# Patient Record
Sex: FEMALE | Race: ASIAN | NOT HISPANIC OR LATINO | URBAN - METROPOLITAN AREA
[De-identification: names, ages, dates, MRNs, and addresses within clinical notes are randomized per-mention and may not be internally consistent; named-entity substitution may affect disease eponyms.]

---

## 2018-05-02 ENCOUNTER — OUTPATIENT (OUTPATIENT)
Dept: OUTPATIENT SERVICES | Facility: HOSPITAL | Age: 33
LOS: 1 days | End: 2018-05-02
Payer: COMMERCIAL

## 2018-05-02 DIAGNOSIS — O26.899 OTHER SPECIFIED PREGNANCY RELATED CONDITIONS, UNSPECIFIED TRIMESTER: ICD-10-CM

## 2018-05-02 DIAGNOSIS — Z3A.00 WEEKS OF GESTATION OF PREGNANCY NOT SPECIFIED: ICD-10-CM

## 2018-05-02 PROCEDURE — 99214 OFFICE O/P EST MOD 30 MIN: CPT

## 2018-05-03 ENCOUNTER — OUTPATIENT (OUTPATIENT)
Dept: INPATIENT UNIT | Facility: HOSPITAL | Age: 33
LOS: 1 days | End: 2018-05-03

## 2018-05-03 ENCOUNTER — INPATIENT (INPATIENT)
Facility: HOSPITAL | Age: 33
LOS: 2 days | Discharge: HOME | End: 2018-05-06
Attending: OBSTETRICS & GYNECOLOGY | Admitting: OBSTETRICS & GYNECOLOGY

## 2018-05-03 ENCOUNTER — TRANSCRIPTION ENCOUNTER (OUTPATIENT)
Age: 33
End: 2018-05-03

## 2018-05-03 VITALS — HEIGHT: 62 IN | WEIGHT: 143.3 LBS

## 2018-05-03 DIAGNOSIS — Z3A.00 WEEKS OF GESTATION OF PREGNANCY NOT SPECIFIED: ICD-10-CM

## 2018-05-03 DIAGNOSIS — O26.90 PREGNANCY RELATED CONDITIONS, UNSPECIFIED, UNSPECIFIED TRIMESTER: ICD-10-CM

## 2018-05-03 DIAGNOSIS — O26.899 OTHER SPECIFIED PREGNANCY RELATED CONDITIONS, UNSPECIFIED TRIMESTER: ICD-10-CM

## 2018-05-03 LAB
BASOPHILS # BLD AUTO: 0.02 K/UL — SIGNIFICANT CHANGE UP (ref 0–0.2)
BASOPHILS # BLD AUTO: 0.04 K/UL — SIGNIFICANT CHANGE UP (ref 0–0.2)
BASOPHILS NFR BLD AUTO: 0.2 % — SIGNIFICANT CHANGE UP (ref 0–2)
BASOPHILS NFR BLD AUTO: 0.3 % — SIGNIFICANT CHANGE UP (ref 0–2)
BLD GP AB SCN SERPL QL: NEGATIVE — SIGNIFICANT CHANGE UP
EOSINOPHIL # BLD AUTO: 0.01 K/UL — SIGNIFICANT CHANGE UP (ref 0–0.5)
EOSINOPHIL # BLD AUTO: 0.04 K/UL — SIGNIFICANT CHANGE UP (ref 0–0.5)
EOSINOPHIL NFR BLD AUTO: 0.1 % — SIGNIFICANT CHANGE UP (ref 0–6)
EOSINOPHIL NFR BLD AUTO: 0.3 % — SIGNIFICANT CHANGE UP (ref 0–6)
HCT VFR BLD CALC: 40 % — SIGNIFICANT CHANGE UP (ref 34.5–45)
HCT VFR BLD CALC: 41.7 % — SIGNIFICANT CHANGE UP (ref 34.5–45)
HGB BLD-MCNC: 13.2 G/DL — SIGNIFICANT CHANGE UP (ref 11.5–15.5)
HGB BLD-MCNC: 13.8 G/DL — SIGNIFICANT CHANGE UP (ref 11.5–15.5)
IMM GRANULOCYTES # BLD AUTO: 0.05 # — SIGNIFICANT CHANGE UP
IMM GRANULOCYTES # BLD AUTO: 0.07 # — SIGNIFICANT CHANGE UP
IMM GRANULOCYTES NFR BLD AUTO: 0.4 % — SIGNIFICANT CHANGE UP (ref 0–1.5)
IMM GRANULOCYTES NFR BLD AUTO: 0.5 % — SIGNIFICANT CHANGE UP (ref 0–1.5)
LYMPHOCYTES # BLD AUTO: 1.81 K/UL — SIGNIFICANT CHANGE UP (ref 1–3.3)
LYMPHOCYTES # BLD AUTO: 13.7 % — SIGNIFICANT CHANGE UP (ref 13–44)
LYMPHOCYTES # BLD AUTO: 2.77 K/UL — SIGNIFICANT CHANGE UP (ref 1–3.3)
LYMPHOCYTES # BLD AUTO: 20.2 % — SIGNIFICANT CHANGE UP (ref 13–44)
MCHC RBC-ENTMCNC: 29 PG — SIGNIFICANT CHANGE UP (ref 27–34)
MCHC RBC-ENTMCNC: 29.3 PG — SIGNIFICANT CHANGE UP (ref 27–34)
MCHC RBC-ENTMCNC: 33 % — SIGNIFICANT CHANGE UP (ref 32–36)
MCHC RBC-ENTMCNC: 33.1 % — SIGNIFICANT CHANGE UP (ref 32–36)
MCV RBC AUTO: 87.6 FL — SIGNIFICANT CHANGE UP (ref 80–100)
MCV RBC AUTO: 88.7 FL — SIGNIFICANT CHANGE UP (ref 80–100)
MONOCYTES # BLD AUTO: 0.9 K/UL — SIGNIFICANT CHANGE UP (ref 0–0.9)
MONOCYTES # BLD AUTO: 0.98 K/UL — HIGH (ref 0–0.9)
MONOCYTES NFR BLD AUTO: 6.6 % — SIGNIFICANT CHANGE UP (ref 2–14)
MONOCYTES NFR BLD AUTO: 7.4 % — SIGNIFICANT CHANGE UP (ref 2–14)
NEUTROPHILS # BLD AUTO: 10.37 K/UL — HIGH (ref 1.8–7.4)
NEUTROPHILS # BLD AUTO: 9.87 K/UL — HIGH (ref 1.8–7.4)
NEUTROPHILS NFR BLD AUTO: 72.1 % — SIGNIFICANT CHANGE UP (ref 43–77)
NEUTROPHILS NFR BLD AUTO: 78.2 % — HIGH (ref 43–77)
NRBC # FLD: 0 — SIGNIFICANT CHANGE UP
NRBC # FLD: 0 — SIGNIFICANT CHANGE UP
PLATELET # BLD AUTO: 212 K/UL — SIGNIFICANT CHANGE UP (ref 150–400)
PLATELET # BLD AUTO: 243 K/UL — SIGNIFICANT CHANGE UP (ref 150–400)
PMV BLD: 10.9 FL — SIGNIFICANT CHANGE UP (ref 7–13)
PMV BLD: 11.3 FL — SIGNIFICANT CHANGE UP (ref 7–13)
RBC # BLD: 4.51 M/UL — SIGNIFICANT CHANGE UP (ref 3.8–5.2)
RBC # BLD: 4.76 M/UL — SIGNIFICANT CHANGE UP (ref 3.8–5.2)
RBC # FLD: 13 % — SIGNIFICANT CHANGE UP (ref 10.3–14.5)
RBC # FLD: 13.2 % — SIGNIFICANT CHANGE UP (ref 10.3–14.5)
RH IG SCN BLD-IMP: POSITIVE — SIGNIFICANT CHANGE UP
RH IG SCN BLD-IMP: POSITIVE — SIGNIFICANT CHANGE UP
T PALLIDUM AB TITR SER: NEGATIVE — SIGNIFICANT CHANGE UP
WBC # BLD: 13.24 K/UL — HIGH (ref 3.8–10.5)
WBC # BLD: 13.69 K/UL — HIGH (ref 3.8–10.5)
WBC # FLD AUTO: 13.24 K/UL — HIGH (ref 3.8–10.5)
WBC # FLD AUTO: 13.69 K/UL — HIGH (ref 3.8–10.5)

## 2018-05-03 RX ORDER — SODIUM CHLORIDE 9 MG/ML
1000 INJECTION, SOLUTION INTRAVENOUS
Qty: 0 | Refills: 0 | Status: DISCONTINUED | OUTPATIENT
Start: 2018-05-03 | End: 2018-05-03

## 2018-05-03 RX ORDER — CITRIC ACID/SODIUM CITRATE 300-500 MG
15 SOLUTION, ORAL ORAL EVERY 4 HOURS
Qty: 0 | Refills: 0 | Status: DISCONTINUED | OUTPATIENT
Start: 2018-05-03 | End: 2018-05-04

## 2018-05-03 RX ORDER — CITRIC ACID/SODIUM CITRATE 300-500 MG
15 SOLUTION, ORAL ORAL EVERY 4 HOURS
Qty: 0 | Refills: 0 | Status: DISCONTINUED | OUTPATIENT
Start: 2018-05-03 | End: 2018-05-03

## 2018-05-03 RX ORDER — SODIUM CHLORIDE 9 MG/ML
1000 INJECTION, SOLUTION INTRAVENOUS ONCE
Qty: 0 | Refills: 0 | Status: COMPLETED | OUTPATIENT
Start: 2018-05-03 | End: 2018-05-03

## 2018-05-03 RX ORDER — OXYTOCIN 10 UNIT/ML
333.33 VIAL (ML) INJECTION
Qty: 20 | Refills: 0 | Status: DISCONTINUED | OUTPATIENT
Start: 2018-05-03 | End: 2018-05-03

## 2018-05-03 RX ORDER — OXYTOCIN 10 UNIT/ML
333.3 VIAL (ML) INJECTION
Qty: 20 | Refills: 0 | Status: DISCONTINUED | OUTPATIENT
Start: 2018-05-03 | End: 2018-05-04

## 2018-05-03 RX ORDER — SODIUM CHLORIDE 9 MG/ML
1000 INJECTION, SOLUTION INTRAVENOUS ONCE
Qty: 0 | Refills: 0 | Status: DISCONTINUED | OUTPATIENT
Start: 2018-05-03 | End: 2018-05-03

## 2018-05-03 RX ORDER — SODIUM CHLORIDE 9 MG/ML
1000 INJECTION, SOLUTION INTRAVENOUS
Qty: 0 | Refills: 0 | Status: DISCONTINUED | OUTPATIENT
Start: 2018-05-03 | End: 2018-05-04

## 2018-05-03 RX ADMIN — SODIUM CHLORIDE 2000 MILLILITER(S): 9 INJECTION, SOLUTION INTRAVENOUS at 20:57

## 2018-05-03 RX ADMIN — SODIUM CHLORIDE 250 MILLILITER(S): 9 INJECTION, SOLUTION INTRAVENOUS at 04:01

## 2018-05-03 RX ADMIN — SODIUM CHLORIDE 2000 MILLILITER(S): 9 INJECTION, SOLUTION INTRAVENOUS at 03:30

## 2018-05-03 NOTE — DISCHARGE NOTE OB - PLAN OF CARE
Return to baseline level of activity Return to labor and delivery if contractions become more frequent, if there is vaginal bleeding, vaginal fluid leaking, or decreased fetal movement. Follow- up with your doctor in 1-2 days.

## 2018-05-03 NOTE — DISCHARGE NOTE OB - PATIENT PORTAL LINK FT
You can access the [facility] Patient Portal, offered by [facility], by registering with the following website: [email or web address]

## 2018-05-03 NOTE — PATIENT PROFILE OB - TRANSPORTATION AVAILABLE, PROFILE
VSS. Up independently. No c/o abdominal pain or nausea. Tolerated regular diet. Reviewed AVS with patient. Answered all the questions. No concerns at this time. Discharge instruction sheets given. Alert and orient x 4. Left floor via wheelchair.     car

## 2018-05-03 NOTE — DISCHARGE NOTE OB - CARE PLAN
Principal Discharge DX:	38 weeks gestation of pregnancy  Goal:	Return to baseline level of activity  Assessment and plan of treatment:	Return to labor and delivery if contractions become more frequent, if there is vaginal bleeding, vaginal fluid leaking, or decreased fetal movement. Follow- up with your doctor in 1-2 days.

## 2018-05-03 NOTE — DISCHARGE NOTE OB - CARE PROVIDER_API CALL
Sarkis Hoover (YASIR), Obstetrics and Gynecology  [address] [place] [address]  Phone: [phone]  Fax: [phone]

## 2018-05-03 NOTE — DISCHARGE NOTE OB - HOSPITAL COURSE
Patient admitted in early labor. Epidural placed. No cervical change made during admission. Epidural removed and patient comfortable. Given labor precautions.

## 2018-05-04 ENCOUNTER — TRANSCRIPTION ENCOUNTER (OUTPATIENT)
Age: 33
End: 2018-05-04

## 2018-05-04 LAB — T PALLIDUM AB TITR SER: NEGATIVE — SIGNIFICANT CHANGE UP

## 2018-05-04 RX ORDER — SODIUM CHLORIDE 9 MG/ML
3 INJECTION INTRAMUSCULAR; INTRAVENOUS; SUBCUTANEOUS EVERY 8 HOURS
Qty: 0 | Refills: 0 | Status: DISCONTINUED | OUTPATIENT
Start: 2018-05-04 | End: 2018-05-04

## 2018-05-04 RX ORDER — PRAMOXINE HYDROCHLORIDE 150 MG/15G
1 AEROSOL, FOAM RECTAL EVERY 4 HOURS
Qty: 0 | Refills: 0 | Status: DISCONTINUED | OUTPATIENT
Start: 2018-05-04 | End: 2018-05-05

## 2018-05-04 RX ORDER — ACETAMINOPHEN 500 MG
975 TABLET ORAL EVERY 6 HOURS
Qty: 0 | Refills: 0 | Status: COMPLETED | OUTPATIENT
Start: 2018-05-04 | End: 2019-04-02

## 2018-05-04 RX ORDER — ACETAMINOPHEN 500 MG
975 TABLET ORAL EVERY 6 HOURS
Qty: 0 | Refills: 0 | Status: DISCONTINUED | OUTPATIENT
Start: 2018-05-04 | End: 2018-05-06

## 2018-05-04 RX ORDER — AER TRAVELER 0.5 G/1
1 SOLUTION RECTAL; TOPICAL EVERY 4 HOURS
Qty: 0 | Refills: 0 | Status: DISCONTINUED | OUTPATIENT
Start: 2018-05-04 | End: 2018-05-06

## 2018-05-04 RX ORDER — OXYTOCIN 10 UNIT/ML
41.67 VIAL (ML) INJECTION
Qty: 20 | Refills: 0 | Status: DISCONTINUED | OUTPATIENT
Start: 2018-05-04 | End: 2018-05-04

## 2018-05-04 RX ORDER — DIPHENHYDRAMINE HCL 50 MG
25 CAPSULE ORAL EVERY 6 HOURS
Qty: 0 | Refills: 0 | Status: DISCONTINUED | OUTPATIENT
Start: 2018-05-04 | End: 2018-05-06

## 2018-05-04 RX ORDER — MAGNESIUM HYDROXIDE 400 MG/1
30 TABLET, CHEWABLE ORAL
Qty: 0 | Refills: 0 | Status: DISCONTINUED | OUTPATIENT
Start: 2018-05-04 | End: 2018-05-06

## 2018-05-04 RX ORDER — HYDROCORTISONE 1 %
1 OINTMENT (GRAM) TOPICAL EVERY 4 HOURS
Qty: 0 | Refills: 0 | Status: DISCONTINUED | OUTPATIENT
Start: 2018-05-04 | End: 2018-05-05

## 2018-05-04 RX ORDER — TETANUS TOXOID, REDUCED DIPHTHERIA TOXOID AND ACELLULAR PERTUSSIS VACCINE, ADSORBED 5; 2.5; 8; 8; 2.5 [IU]/.5ML; [IU]/.5ML; UG/.5ML; UG/.5ML; UG/.5ML
0.5 SUSPENSION INTRAMUSCULAR ONCE
Qty: 0 | Refills: 0 | Status: DISCONTINUED | OUTPATIENT
Start: 2018-05-04 | End: 2018-05-06

## 2018-05-04 RX ORDER — KETOROLAC TROMETHAMINE 30 MG/ML
30 SYRINGE (ML) INJECTION ONCE
Qty: 0 | Refills: 0 | Status: DISCONTINUED | OUTPATIENT
Start: 2018-05-04 | End: 2018-05-04

## 2018-05-04 RX ORDER — HYDROCORTISONE 1 %
1 OINTMENT (GRAM) TOPICAL EVERY 4 HOURS
Qty: 0 | Refills: 0 | Status: DISCONTINUED | OUTPATIENT
Start: 2018-05-04 | End: 2018-05-04

## 2018-05-04 RX ORDER — IBUPROFEN 200 MG
600 TABLET ORAL EVERY 6 HOURS
Qty: 0 | Refills: 0 | Status: DISCONTINUED | OUTPATIENT
Start: 2018-05-04 | End: 2018-05-06

## 2018-05-04 RX ORDER — PRAMOXINE HYDROCHLORIDE 150 MG/15G
1 AEROSOL, FOAM RECTAL EVERY 4 HOURS
Qty: 0 | Refills: 0 | Status: DISCONTINUED | OUTPATIENT
Start: 2018-05-04 | End: 2018-05-04

## 2018-05-04 RX ORDER — LANOLIN
1 OINTMENT (GRAM) TOPICAL EVERY 6 HOURS
Qty: 0 | Refills: 0 | Status: DISCONTINUED | OUTPATIENT
Start: 2018-05-04 | End: 2018-05-06

## 2018-05-04 RX ORDER — SIMETHICONE 80 MG/1
80 TABLET, CHEWABLE ORAL EVERY 6 HOURS
Qty: 0 | Refills: 0 | Status: DISCONTINUED | OUTPATIENT
Start: 2018-05-04 | End: 2018-05-06

## 2018-05-04 RX ORDER — GLYCERIN ADULT
1 SUPPOSITORY, RECTAL RECTAL AT BEDTIME
Qty: 0 | Refills: 0 | Status: DISCONTINUED | OUTPATIENT
Start: 2018-05-04 | End: 2018-05-06

## 2018-05-04 RX ORDER — OXYTOCIN 10 UNIT/ML
41.67 VIAL (ML) INJECTION
Qty: 20 | Refills: 0 | Status: DISCONTINUED | OUTPATIENT
Start: 2018-05-04 | End: 2018-05-05

## 2018-05-04 RX ORDER — IBUPROFEN 200 MG
600 TABLET ORAL EVERY 6 HOURS
Qty: 0 | Refills: 0 | Status: COMPLETED | OUTPATIENT
Start: 2018-05-04 | End: 2019-04-02

## 2018-05-04 RX ORDER — OXYCODONE HYDROCHLORIDE 5 MG/1
5 TABLET ORAL
Qty: 0 | Refills: 0 | Status: DISCONTINUED | OUTPATIENT
Start: 2018-05-04 | End: 2018-05-06

## 2018-05-04 RX ORDER — DIBUCAINE 1 %
1 OINTMENT (GRAM) RECTAL EVERY 4 HOURS
Qty: 0 | Refills: 0 | Status: DISCONTINUED | OUTPATIENT
Start: 2018-05-04 | End: 2018-05-04

## 2018-05-04 RX ORDER — SODIUM CHLORIDE 9 MG/ML
3 INJECTION INTRAMUSCULAR; INTRAVENOUS; SUBCUTANEOUS EVERY 8 HOURS
Qty: 0 | Refills: 0 | Status: DISCONTINUED | OUTPATIENT
Start: 2018-05-04 | End: 2018-05-06

## 2018-05-04 RX ORDER — OXYCODONE HYDROCHLORIDE 5 MG/1
5 TABLET ORAL EVERY 4 HOURS
Qty: 0 | Refills: 0 | Status: DISCONTINUED | OUTPATIENT
Start: 2018-05-04 | End: 2018-05-06

## 2018-05-04 RX ORDER — AER TRAVELER 0.5 G/1
1 SOLUTION RECTAL; TOPICAL EVERY 4 HOURS
Qty: 0 | Refills: 0 | Status: DISCONTINUED | OUTPATIENT
Start: 2018-05-04 | End: 2018-05-04

## 2018-05-04 RX ORDER — DIBUCAINE 1 %
1 OINTMENT (GRAM) RECTAL EVERY 4 HOURS
Qty: 0 | Refills: 0 | Status: DISCONTINUED | OUTPATIENT
Start: 2018-05-04 | End: 2018-05-06

## 2018-05-04 RX ORDER — DOCUSATE SODIUM 100 MG
100 CAPSULE ORAL
Qty: 0 | Refills: 0 | Status: DISCONTINUED | OUTPATIENT
Start: 2018-05-04 | End: 2018-05-06

## 2018-05-04 RX ADMIN — SODIUM CHLORIDE 3 MILLILITER(S): 9 INJECTION INTRAMUSCULAR; INTRAVENOUS; SUBCUTANEOUS at 21:07

## 2018-05-04 RX ADMIN — Medication 30 MILLIGRAM(S): at 08:37

## 2018-05-04 RX ADMIN — Medication 30 MILLIGRAM(S): at 07:45

## 2018-05-04 RX ADMIN — SODIUM CHLORIDE 250 MILLILITER(S): 9 INJECTION, SOLUTION INTRAVENOUS at 02:15

## 2018-05-04 RX ADMIN — Medication 975 MILLIGRAM(S): at 17:03

## 2018-05-04 RX ADMIN — Medication 600 MILLIGRAM(S): at 17:03

## 2018-05-04 RX ADMIN — Medication 975 MILLIGRAM(S): at 18:03

## 2018-05-04 RX ADMIN — Medication 600 MILLIGRAM(S): at 18:03

## 2018-05-04 RX ADMIN — Medication 125 MILLIUNIT(S)/MIN: at 07:44

## 2018-05-04 RX ADMIN — SODIUM CHLORIDE 3 MILLILITER(S): 9 INJECTION INTRAMUSCULAR; INTRAVENOUS; SUBCUTANEOUS at 16:42

## 2018-05-04 NOTE — DISCHARGE NOTE OB - CARE PROVIDER_API CALL
Sarkis Hoover (YASIR), Obstetrics and Gynecology  [address] [place] [address]  Phone: [phone]  Fax: [phone]

## 2018-05-04 NOTE — DISCHARGE NOTE OB - PATIENT PORTAL LINK FT
You can access the [facility] Patient Portal, offered by [facility], by registering with the following website: [email or web address]

## 2018-05-04 NOTE — DISCHARGE NOTE OB - CARE PLAN
Principal Discharge DX:	 (normal spontaneous vaginal delivery)  Goal:	Good recovery  Assessment and plan of treatment:	f/u  6 weeks

## 2018-05-04 NOTE — LACTATION INITIAL EVALUATION - INTERVENTION OUTCOME
verbalizes understanding/demonstrates understanding of teaching/encouraged   skin  to  skin  , observe  feeding  cues  ,  offere assistance  as  needed.

## 2018-05-04 NOTE — LACTATION INITIAL EVALUATION - LACTATION INTERVENTIONS
initiate skin to skin/initiate hand expression routine/assisted with deep latch and positioning  discussed  signs  of  effective  feeding and  swallowing. discussed  compression at  breast when  nbn  stops  drinking  and  is  still sucking..   if  nbn  not  breastfeeding  effectively  hand  express  and  pump  and   give  teaspoons  between  feedings alternative  feeding   method.

## 2018-05-04 NOTE — DISCHARGE NOTE OB - MATERIALS PROVIDED
Immunization Record/Breastfeeding Log/[facility] Hearing Screen Program/Shaken Baby Prevention Handout/Back To Sleep Handout

## 2018-05-05 RX ORDER — PRAMOXINE HYDROCHLORIDE 150 MG/15G
1 AEROSOL, FOAM RECTAL EVERY 4 HOURS
Qty: 0 | Refills: 0 | Status: DISCONTINUED | OUTPATIENT
Start: 2018-05-05 | End: 2018-05-06

## 2018-05-05 RX ORDER — HYDROCORTISONE 1 %
1 OINTMENT (GRAM) TOPICAL EVERY 4 HOURS
Qty: 0 | Refills: 0 | Status: DISCONTINUED | OUTPATIENT
Start: 2018-05-05 | End: 2018-05-06

## 2018-05-05 RX ADMIN — Medication 600 MILLIGRAM(S): at 00:02

## 2018-05-05 RX ADMIN — Medication 600 MILLIGRAM(S): at 10:15

## 2018-05-05 RX ADMIN — Medication 600 MILLIGRAM(S): at 23:53

## 2018-05-05 RX ADMIN — Medication 600 MILLIGRAM(S): at 01:57

## 2018-05-05 RX ADMIN — Medication 975 MILLIGRAM(S): at 00:02

## 2018-05-05 RX ADMIN — Medication 975 MILLIGRAM(S): at 18:15

## 2018-05-05 RX ADMIN — Medication 975 MILLIGRAM(S): at 01:57

## 2018-05-05 RX ADMIN — Medication 975 MILLIGRAM(S): at 09:15

## 2018-05-05 RX ADMIN — Medication 975 MILLIGRAM(S): at 10:15

## 2018-05-05 RX ADMIN — Medication 600 MILLIGRAM(S): at 19:15

## 2018-05-05 RX ADMIN — Medication 975 MILLIGRAM(S): at 19:15

## 2018-05-05 RX ADMIN — Medication 600 MILLIGRAM(S): at 09:15

## 2018-05-05 RX ADMIN — Medication 975 MILLIGRAM(S): at 23:53

## 2018-05-05 RX ADMIN — Medication 600 MILLIGRAM(S): at 18:15

## 2018-05-05 NOTE — PROGRESS NOTE ADULT - SUBJECTIVE AND OBJECTIVE BOX
Anesthesia Post-op Note    POD#1 S/P labor epidural    Patient is doing well.  OOBAA. Tolerating diet.  No residual anesthetic issues or complications noted.

## 2018-05-06 VITALS
DIASTOLIC BLOOD PRESSURE: 71 MMHG | OXYGEN SATURATION: 100 % | TEMPERATURE: 98 F | SYSTOLIC BLOOD PRESSURE: 115 MMHG | HEART RATE: 61 BPM | RESPIRATION RATE: 18 BRPM

## 2018-05-06 RX ORDER — ACETAMINOPHEN 500 MG
3 TABLET ORAL
Qty: 0 | Refills: 0 | DISCHARGE
Start: 2018-05-06

## 2018-05-06 RX ORDER — IBUPROFEN 200 MG
1 TABLET ORAL
Qty: 0 | Refills: 0 | DISCHARGE
Start: 2018-05-06

## 2018-05-06 RX ADMIN — Medication 600 MILLIGRAM(S): at 09:00

## 2018-05-06 RX ADMIN — Medication 600 MILLIGRAM(S): at 09:45

## 2018-05-06 RX ADMIN — Medication 975 MILLIGRAM(S): at 09:00

## 2018-05-06 RX ADMIN — Medication 600 MILLIGRAM(S): at 00:21

## 2018-05-06 RX ADMIN — Medication 975 MILLIGRAM(S): at 09:45

## 2018-05-06 RX ADMIN — Medication 975 MILLIGRAM(S): at 00:21

## 2019-01-18 NOTE — DISCHARGE NOTE OB - CHANGE SANITARY PADS FREQUENTLY.  WASHING AND WIPING SHOULD OCCUR FROM FRONT TO BACK
Patient Education     Skin Tear (Skin Avulsion)  A skin avulsion is a tearing of the top layer of skin. This commonly happens after a fall or other injury. It also tends to be more common in older people, or those taking blood thinners or steroids for long periods of time.  Home care  The following guidelines will help you care for your wound at home:  · Keep the wound clean and dry for the first 48 hours after the stitches have been placed.  · If there is a dressing or bandage, change it when it gets wet or dirty. Otherwise, leave it on for the first 24 hours, then change it once a day or as often as the doctor says.  · If stitches or staples were used, check the wound every day.  · After taking off the dressing, wash the area gently with soap and water. Clean as close to the stitches as you can. Avoid washing or rubbing the stitches directly.  · After 3 days you can keep the bandages off the wound, unless told otherwise. Allow the wound to be open to the air.  · Keep a thin layer of antibiotic ointment on the cut. This will keep the wound clean, make it easier to remove the stitches, and reduce scarring.  · If your wound is oozing, you can put a nonstick dressing over it. Then, reapply the bandage or dressing as you were told.  · You can shower as usual after the first 24 hours, but don't soak the area in water (no baths or swimming) until the stitches or staples are taken out.  · If surgical tape was used, keep the area clean and dry. If it becomes wet, blot it dry with a towel.  · If skin glue was used, don't put any creams, lotions, or antibiotic ointments on it. These can dissolve the glue. Usually the glue will flake off in about 5 to 10 days by itself. Try to resist picking it off before that so the wound doesn't open up. When it gets wet, dry it gently.  Here is some information about medications:  · You may use acetaminophen or ibuprofen to control pain, unless another pain medicine was given. If you  have chronic liver or kidney disease or ever had a stomach ulcer or GI bleeding, talk with your doctor before using these medicines.  · If you were given antibiotics, take them until they are all used up. It is important to finish the antibiotics even if the wound looks better. This will ensure that the infection has cleared.  Follow-up care  Follow up with your doctor, clinic, or this facility as you were advised, and:  · Watch for any signs of infection, such as increasing redness, swelling, or pus coming out. If this happens, don't wait for your scheduled visit. Instead, see a doctor sooner.  · Stitches or staples are usually taken out within 5 to 14 days. This varies depending on what part of your body they are on, and the type of wound. The doctor will tell you how long stitches should be left in.   · If surgical tape was used, it is usually left on for 7 to 10 days. You can remove surgical tape after that unless you were told otherwise. If you try to remove it, and it is too hard, soaking can help. If the edges of the cut pull apart, stop removing the tape and follow up with your doctor  · As mentioned above, skin glue will flake off by itself in 5 to 10 days, so you don't need to pull it off.  Note: If any X-rays were done, a radiologist will review them. You will be notified of any changes that may affect your care.  When to seek medical care  Get prompt medical attention if any of the following occur:  · Increasing pain in the wound  · Redness, swelling, or pus coming from the wound  · Fever of 100.4ºF (38ºC) or higher, or as directed by your health care provider  · Sutures or staples come apart or fall out before your next appointment  · Surgical tape closures fall off within 7 days, or the wound edges re-open  · Bleeding not controlled by direct pressure  © 9350-5881 [employer]. [address] [place] [address], [place] [address]. All rights reserved. This information is not intended as a substitute  for professional medical care. Always follow your healthcare professional's instructions.            Statement Selected

## 2022-03-07 ENCOUNTER — INPATIENT (INPATIENT)
Facility: HOSPITAL | Age: 37
LOS: 2 days | Discharge: HOME | End: 2022-03-10
Attending: OBSTETRICS & GYNECOLOGY | Admitting: OBSTETRICS & GYNECOLOGY

## 2022-03-07 ENCOUNTER — TRANSCRIPTION ENCOUNTER (OUTPATIENT)
Age: 37
End: 2022-03-07

## 2022-03-07 VITALS
SYSTOLIC BLOOD PRESSURE: 110 MMHG | DIASTOLIC BLOOD PRESSURE: 66 MMHG | RESPIRATION RATE: 16 BRPM | HEART RATE: 86 BPM | TEMPERATURE: 99 F

## 2022-03-07 DIAGNOSIS — O26.899 OTHER SPECIFIED PREGNANCY RELATED CONDITIONS, UNSPECIFIED TRIMESTER: ICD-10-CM

## 2022-03-07 DIAGNOSIS — Z3A.00 WEEKS OF GESTATION OF PREGNANCY NOT SPECIFIED: ICD-10-CM

## 2022-03-07 RX ORDER — SODIUM CHLORIDE 9 MG/ML
1000 INJECTION, SOLUTION INTRAVENOUS ONCE
Refills: 0 | Status: COMPLETED | OUTPATIENT
Start: 2022-03-07 | End: 2022-03-08

## 2022-03-07 RX ORDER — OXYTOCIN 10 UNIT/ML
333.33 VIAL (ML) INJECTION
Qty: 20 | Refills: 0 | Status: DISCONTINUED | OUTPATIENT
Start: 2022-03-07 | End: 2022-03-10

## 2022-03-07 RX ORDER — SODIUM CHLORIDE 9 MG/ML
1000 INJECTION, SOLUTION INTRAVENOUS
Refills: 0 | Status: DISCONTINUED | OUTPATIENT
Start: 2022-03-07 | End: 2022-03-08

## 2022-03-07 RX ORDER — AMPICILLIN TRIHYDRATE 250 MG
1 CAPSULE ORAL EVERY 4 HOURS
Refills: 0 | Status: DISCONTINUED | OUTPATIENT
Start: 2022-03-07 | End: 2022-03-08

## 2022-03-07 RX ORDER — AMPICILLIN TRIHYDRATE 250 MG
2 CAPSULE ORAL ONCE
Refills: 0 | Status: COMPLETED | OUTPATIENT
Start: 2022-03-07 | End: 2022-03-07

## 2022-03-07 RX ORDER — SODIUM CHLORIDE 9 MG/ML
500 INJECTION, SOLUTION INTRAVENOUS ONCE
Refills: 0 | Status: DISCONTINUED | OUTPATIENT
Start: 2022-03-07 | End: 2022-03-10

## 2022-03-07 NOTE — OB PROVIDER H&P - ASSESSMENT
37 yo AMA  @ 39.3 wks c/o contractions 8/10 pain worse q 3 minutes since 8pm. denies vb or lof, reports +GFM. AP course complicated by +GBS. denies fever chills ha n/v  new swelling vision changes epigastric pain cp sob or cough. last saw OB today 2-3 cm.    GBS: positive  meds: PNV  All denies  PMH: denies  PSH: denies  gyn hx: denies  OB hx:  2018 6#3, MAB 2021    d/w Dr Sommer admit for labor @ 39.3 wks  epidural for pain control  Ampicillin for +GBS  prentals reviewed  covid deferred 2/2 testing positive 21  repeat VE exam  IV fluid bolus for FHT     35 yo AMA  @ 39.3 wks c/o contractions 8/10 pain worse q 3 minutes since 8pm. denies vb or lof, reports +GFM. AP course complicated by +GBS. denies fever chills ha n/v  new swelling vision changes epigastric pain cp sob or cough. last saw OB today 2-3 cm.    GBS: positive  meds: PNV  All denies  PMH: denies  PSH: denies  gyn hx: denies  OB hx:  2018 6#3, MAB 2021    d/w Dr Sommer admit for labor @ 39.3 wks  epidural for pain control  Ampicillin for +GBS  prentals reviewed  covid deferred 2/2 testing positive 21  repeat VE exam  IV fluid bolus for FHT    FHT discussed with Dr Almanza- IV bolus ordered

## 2022-03-07 NOTE — OB PROVIDER H&P - NSHPPHYSICALEXAM_GEN_ALL_CORE
abd soft gravid NT  CV RRR  LS clear bilaterally  TAS: vertex  SVE: 4.5/80/-3 with bulging membranes  FHT: minimal variability, + late decelerations  toco: q 4 minutes  Vital Signs Last 24 Hrs  T(C): 37.2 (07 Mar 2022 22:47), Max: 37.2 (07 Mar 2022 22:47)  T(F): 99 (07 Mar 2022 22:47), Max: 99 (07 Mar 2022 22:47)  HR: 86 (07 Mar 2022 23:18) (86 - 86)  BP: 124/69 (07 Mar 2022 23:18) (110/66 - 124/69)  BP(mean): --  RR: 16 (07 Mar 2022 22:47) (16 - 16)  SpO2: -- abd soft gravid NT  CV RRR  LS clear bilaterally  TAS: vertex  SVE: 4.5/80/-3 with bulging membranes  FHT: minimal variability, no accelerations, + late decelerations- pt repositioned on left side and IV fluid bolus  toco: q 4 minutes  Vital Signs Last 24 Hrs  T(C): 37.2 (07 Mar 2022 22:47), Max: 37.2 (07 Mar 2022 22:47)  T(F): 99 (07 Mar 2022 22:47), Max: 99 (07 Mar 2022 22:47)  HR: 86 (07 Mar 2022 23:18) (86 - 86)  BP: 124/69 (07 Mar 2022 23:18) (110/66 - 124/69)  BP(mean): --  RR: 16 (07 Mar 2022 22:47) (16 - 16)  SpO2: --

## 2022-03-07 NOTE — OB RN TRIAGE NOTE - FALL HARM RISK - UNIVERSAL INTERVENTIONS
Bed in lowest position, wheels locked, appropriate side rails in place/Call bell, personal items and telephone in reach/Instruct patient to call for assistance before getting out of bed or chair/Non-slip footwear when patient is out of bed/[place] to call system/Physically safe environment - no spills, clutter or unnecessary equipment/Purposeful Proactive Rounding/Room/bathroom lighting operational, light cord in reach

## 2022-03-07 NOTE — OB PROVIDER H&P - NS_FINALEDD_OBGYN_ALL_OB_DT
Call received from patient stating he cracked one of his molars on 12/28/21. Patient called 12/29 requesting antibiotics. Patient received a prescription on 12/30. States he saw [facility] on Saturday. They would not pull the tooth because his blood pressure was too high. States blood pressure was 185/101 and 176/110. Patient was advised to follow up with primary care provider. He was also referred to an oral surgeon. Patient was having a lot of pain when he was seen on Saturday so feels the high blood pressure was related to this. Patient feels the antibiotic must have started to help because the pain improved yesterday. States it now only hurts when he bites down. Appointment scheduled.    11-Mar-2022

## 2022-03-07 NOTE — OB PROVIDER H&P - PROBLEM SELECTOR PLAN 1
d/w Dr Sommer admit for labor @ 39.3 wks  epidural for pain control  Ampicillin for +GBS  prentals reviewed  covid deferred 2/2 testing positive 12/18/21  repeat VE exam  IV fluid bolus for FHT

## 2022-03-07 NOTE — OB PROVIDER H&P - HISTORY OF PRESENT ILLNESS
37 yo AMA  @ 39.3 wks c/o contractions 8/10 pain worse q 3 minutes since 8pm. denies vb or lof, reports +GFM. AP course complicated by +GBS. denies fever chills ha n/v  new swelling vision changes epigastric pain cp sob or cough. last saw OB today 2-3 cm.  GBS: positive  meds: PNV  All denies  PMH: denies  PSH: denies  gyn hx: denies  OB hx:  2018 6#3, MAB 2021

## 2022-03-07 NOTE — OB PROVIDER H&P - NSPPHNORISK_OBGYN_ALL_OB
----- Message from Serena Garcia MD sent at 10/11/2019  4:41 PM EDT -----  Approved  ----- Message -----  From: Francisca Lo MA  Sent: 10/10/2019  12:42 PM EDT  To: Sleep Medicine [place] Provider    This sleep study needs approval      If approved please sign and return to clerical pool  If denied please include reasons why  Also provide alternative testing if warranted  Please sign and return to clerical pool  In my judgment no risk for PPH has been identified at this time.

## 2022-03-07 NOTE — OB PROVIDER TRIAGE NOTE - HISTORY OF PRESENT ILLNESS
35 yo AMA  @ 39.3 wks c/o contractions 8/10 pain worse q 3 minutes since 8pm. denies vb or lof, reports +GFM. AP course complicated by +GBS. denies fever chills ha n/v  new swelling vision changes epigastric pain cp sob or cough. last saw OB today 2-3 cm.  GBS: positive  meds: PNV  All denies  PMH: denies  PSH: denies  gyn hx: denies  OB hx:  2018 6#3, MAB 2021

## 2022-03-07 NOTE — OB RN TRIAGE NOTE - NSICDXPASTMEDICALHX_GEN_ALL_CORE_FT
PAST MEDICAL HISTORY:  Spontaneous miscarriage     Vaginal delivery 2018     PAST MEDICAL HISTORY:  2019 novel coronavirus disease (COVID-19) 12/18/2021

## 2022-03-07 NOTE — OB PROVIDER TRIAGE NOTE - NSOBPROVIDERNOTE_OBGYN_ALL_OB_FT
35 yo AMA  @ 39.3 wks c/o contractions 8/10 pain worse q 3 minutes since 8pm. denies vb or lof, reports +GFM. AP course complicated by +GBS. denies fever chills ha n/v  new swelling vision changes epigastric pain cp sob or cough. last saw OB today 2-3 cm.  GBS: positive  meds: PNV  All denies  PMH: denies  PSH: denies  gyn hx: denies  OB hx:  2018 6#3, MAB 2021    d/w Dr Sommer admit for labor @ 39.3 wks  epidural for pain control  Ampicillin for +GBS  prentals reviewed  covid deferred 2/2 testing positive 21  repeat VE exam  IV fluid bolus for FHT

## 2022-03-07 NOTE — OB PROVIDER TRIAGE NOTE - NSHPPHYSICALEXAM_GEN_ALL_CORE
abd soft gravid NT  CV RRR  LS clear bilaterally  TAS: vertex  SVE: 4.5/80/-3 with bulging membranes  FHT: minimal variability, + late decelerations  toco: q 4 minutes  Vital Signs Last 24 Hrs  T(C): 37.2 (07 Mar 2022 22:47), Max: 37.2 (07 Mar 2022 22:47)  T(F): 99 (07 Mar 2022 22:47), Max: 99 (07 Mar 2022 22:47)  HR: 86 (07 Mar 2022 23:18) (86 - 86)  BP: 124/69 (07 Mar 2022 23:18) (110/66 - 124/69)  BP(mean): --  RR: 16 (07 Mar 2022 22:47) (16 - 16)  SpO2: --

## 2022-03-08 LAB
BASOPHILS # BLD AUTO: 0.04 K/UL — SIGNIFICANT CHANGE UP (ref 0–0.2)
BASOPHILS NFR BLD AUTO: 0.3 % — SIGNIFICANT CHANGE UP (ref 0–2)
BLD GP AB SCN SERPL QL: NEGATIVE — SIGNIFICANT CHANGE UP
COVID-19 SPIKE DOMAIN AB INTERP: POSITIVE
COVID-19 SPIKE DOMAIN ANTIBODY RESULT: >250 U/ML — HIGH
EOSINOPHIL # BLD AUTO: 0.08 K/UL — SIGNIFICANT CHANGE UP (ref 0–0.5)
EOSINOPHIL NFR BLD AUTO: 0.6 % — SIGNIFICANT CHANGE UP (ref 0–6)
HCT VFR BLD CALC: 35 % — SIGNIFICANT CHANGE UP (ref 34.5–45)
HGB BLD-MCNC: 12.2 G/DL — SIGNIFICANT CHANGE UP (ref 11.5–15.5)
IANC: 9.31 K/UL — HIGH (ref 1.5–8.5)
IMM GRANULOCYTES NFR BLD AUTO: 0.4 % — SIGNIFICANT CHANGE UP (ref 0–1.5)
LYMPHOCYTES # BLD AUTO: 16.5 % — SIGNIFICANT CHANGE UP (ref 13–44)
LYMPHOCYTES # BLD AUTO: 2.08 K/UL — SIGNIFICANT CHANGE UP (ref 1–3.3)
MCHC RBC-ENTMCNC: 30.3 PG — SIGNIFICANT CHANGE UP (ref 27–34)
MCHC RBC-ENTMCNC: 34.9 GM/DL — SIGNIFICANT CHANGE UP (ref 32–36)
MCV RBC AUTO: 87.1 FL — SIGNIFICANT CHANGE UP (ref 80–100)
MONOCYTES # BLD AUTO: 1.01 K/UL — HIGH (ref 0–0.9)
MONOCYTES NFR BLD AUTO: 8 % — SIGNIFICANT CHANGE UP (ref 2–14)
NEUTROPHILS # BLD AUTO: 9.31 K/UL — HIGH (ref 1.8–7.4)
NEUTROPHILS NFR BLD AUTO: 74.2 % — SIGNIFICANT CHANGE UP (ref 43–77)
NRBC # BLD: 0 /100 WBCS — SIGNIFICANT CHANGE UP
NRBC # FLD: 0 K/UL — SIGNIFICANT CHANGE UP
PLATELET # BLD AUTO: 221 K/UL — SIGNIFICANT CHANGE UP (ref 150–400)
RBC # BLD: 4.02 M/UL — SIGNIFICANT CHANGE UP (ref 3.8–5.2)
RBC # FLD: 12.8 % — SIGNIFICANT CHANGE UP (ref 10.3–14.5)
RH IG SCN BLD-IMP: POSITIVE — SIGNIFICANT CHANGE UP
SARS-COV-2 IGG+IGM SERPL QL IA: >250 U/ML — HIGH
SARS-COV-2 IGG+IGM SERPL QL IA: POSITIVE
T PALLIDUM AB TITR SER: NEGATIVE — SIGNIFICANT CHANGE UP
WBC # BLD: 12.57 K/UL — HIGH (ref 3.8–10.5)
WBC # FLD AUTO: 12.57 K/UL — HIGH (ref 3.8–10.5)

## 2022-03-08 RX ORDER — SODIUM CHLORIDE 9 MG/ML
3 INJECTION INTRAMUSCULAR; INTRAVENOUS; SUBCUTANEOUS EVERY 8 HOURS
Refills: 0 | Status: DISCONTINUED | OUTPATIENT
Start: 2022-03-08 | End: 2022-03-10

## 2022-03-08 RX ORDER — HYDROCORTISONE 1 %
1 OINTMENT (GRAM) TOPICAL EVERY 6 HOURS
Refills: 0 | Status: DISCONTINUED | OUTPATIENT
Start: 2022-03-08 | End: 2022-03-10

## 2022-03-08 RX ORDER — MAGNESIUM HYDROXIDE 400 MG/1
30 TABLET, CHEWABLE ORAL
Refills: 0 | Status: DISCONTINUED | OUTPATIENT
Start: 2022-03-08 | End: 2022-03-10

## 2022-03-08 RX ORDER — BENZOCAINE 10 %
1 GEL (GRAM) MUCOUS MEMBRANE EVERY 6 HOURS
Refills: 0 | Status: DISCONTINUED | OUTPATIENT
Start: 2022-03-08 | End: 2022-03-10

## 2022-03-08 RX ORDER — KETOROLAC TROMETHAMINE 30 MG/ML
30 SYRINGE (ML) INJECTION ONCE
Refills: 0 | Status: DISCONTINUED | OUTPATIENT
Start: 2022-03-08 | End: 2022-03-10

## 2022-03-08 RX ORDER — PRAMOXINE HYDROCHLORIDE 150 MG/15G
1 AEROSOL, FOAM RECTAL EVERY 4 HOURS
Refills: 0 | Status: DISCONTINUED | OUTPATIENT
Start: 2022-03-08 | End: 2022-03-10

## 2022-03-08 RX ORDER — OXYCODONE HYDROCHLORIDE 5 MG/1
5 TABLET ORAL
Refills: 0 | Status: DISCONTINUED | OUTPATIENT
Start: 2022-03-08 | End: 2022-03-10

## 2022-03-08 RX ORDER — ACETAMINOPHEN 500 MG
3 TABLET ORAL
Qty: 0 | Refills: 0 | DISCHARGE
Start: 2022-03-08

## 2022-03-08 RX ORDER — IBUPROFEN 200 MG
600 TABLET ORAL EVERY 6 HOURS
Refills: 0 | Status: COMPLETED | OUTPATIENT
Start: 2022-03-08 | End: 2023-02-04

## 2022-03-08 RX ORDER — DIPHENHYDRAMINE HCL 50 MG
25 CAPSULE ORAL EVERY 6 HOURS
Refills: 0 | Status: DISCONTINUED | OUTPATIENT
Start: 2022-03-08 | End: 2022-03-10

## 2022-03-08 RX ORDER — LANOLIN
1 OINTMENT (GRAM) TOPICAL EVERY 6 HOURS
Refills: 0 | Status: DISCONTINUED | OUTPATIENT
Start: 2022-03-08 | End: 2022-03-10

## 2022-03-08 RX ORDER — ACETAMINOPHEN 500 MG
975 TABLET ORAL
Refills: 0 | Status: DISCONTINUED | OUTPATIENT
Start: 2022-03-08 | End: 2022-03-10

## 2022-03-08 RX ORDER — DIBUCAINE 1 %
1 OINTMENT (GRAM) RECTAL EVERY 6 HOURS
Refills: 0 | Status: DISCONTINUED | OUTPATIENT
Start: 2022-03-08 | End: 2022-03-10

## 2022-03-08 RX ORDER — SIMETHICONE 80 MG/1
80 TABLET, CHEWABLE ORAL EVERY 4 HOURS
Refills: 0 | Status: DISCONTINUED | OUTPATIENT
Start: 2022-03-08 | End: 2022-03-10

## 2022-03-08 RX ORDER — AER TRAVELER 0.5 G/1
1 SOLUTION RECTAL; TOPICAL EVERY 4 HOURS
Refills: 0 | Status: DISCONTINUED | OUTPATIENT
Start: 2022-03-08 | End: 2022-03-10

## 2022-03-08 RX ORDER — TETANUS TOXOID, REDUCED DIPHTHERIA TOXOID AND ACELLULAR PERTUSSIS VACCINE, ADSORBED 5; 2.5; 8; 8; 2.5 [IU]/.5ML; [IU]/.5ML; UG/.5ML; UG/.5ML; UG/.5ML
0.5 SUSPENSION INTRAMUSCULAR ONCE
Refills: 0 | Status: DISCONTINUED | OUTPATIENT
Start: 2022-03-08 | End: 2022-03-10

## 2022-03-08 RX ORDER — IBUPROFEN 200 MG
600 TABLET ORAL EVERY 6 HOURS
Refills: 0 | Status: DISCONTINUED | OUTPATIENT
Start: 2022-03-08 | End: 2022-03-10

## 2022-03-08 RX ORDER — OXYCODONE HYDROCHLORIDE 5 MG/1
5 TABLET ORAL ONCE
Refills: 0 | Status: DISCONTINUED | OUTPATIENT
Start: 2022-03-08 | End: 2022-03-10

## 2022-03-08 RX ORDER — IBUPROFEN 200 MG
1 TABLET ORAL
Qty: 0 | Refills: 0 | DISCHARGE
Start: 2022-03-08

## 2022-03-08 RX ORDER — OXYTOCIN 10 UNIT/ML
333.33 VIAL (ML) INJECTION
Qty: 20 | Refills: 0 | Status: DISCONTINUED | OUTPATIENT
Start: 2022-03-08 | End: 2022-03-10

## 2022-03-08 RX ADMIN — Medication 600 MILLIGRAM(S): at 18:17

## 2022-03-08 RX ADMIN — SODIUM CHLORIDE 2000 MILLILITER(S): 9 INJECTION, SOLUTION INTRAVENOUS at 00:02

## 2022-03-08 RX ADMIN — Medication 975 MILLIGRAM(S): at 20:52

## 2022-03-08 RX ADMIN — Medication 600 MILLIGRAM(S): at 12:35

## 2022-03-08 RX ADMIN — Medication 975 MILLIGRAM(S): at 21:22

## 2022-03-08 RX ADMIN — Medication 1000 MILLIUNIT(S)/MIN: at 04:54

## 2022-03-08 RX ADMIN — Medication 600 MILLIGRAM(S): at 12:03

## 2022-03-08 RX ADMIN — Medication 1 TABLET(S): at 12:03

## 2022-03-08 RX ADMIN — Medication 216 GRAM(S): at 00:00

## 2022-03-08 NOTE — LACTATION INITIAL EVALUATION - LACTATION INTERVENTIONS
assisted to put baby to both breasts in football hold position with deep latch and baby noted to suck with stimulation/initiate/review safe skin-to-skin/initiate/review hand expression/initiate/review breast massage/compression/reviewed components of an effective feeding and at least 8 effective feedings per day required/reviewed importance of monitoring infant diapers, the breastfeeding log, and minimum output each day/reviewed risks of unnecessary formula supplementation/reviewed risks of artificial nipples/reviewed benefits and recommendations for rooming in

## 2022-03-08 NOTE — OB PROVIDER LABOR PROGRESS NOTE - NS_SUBJECTIVE/OBJECTIVE_OBGYN_ALL_OB_FT
pt seen for evaluation of cervical change
PGY1 Labor & Delivery Progress Note     Pt examined @0120 due to increased rectal pressure     T(C): 36.9 (03-08-22 @ 00:39), Max: 37.2 (03-07-22 @ 22:47)  HR: 100 (03-08-22 @ 01:24) (86 - 107)  BP: 98/54 (03-08-22 @ 01:24) (84/53 - 124/69)  RR: 20 (03-08-22 @ 00:39) (16 - 20)  SpO2: 94% (03-08-22 @ 01:19) (86% - 100%)
PGY1 Labor & Delivery Progress Note     Pt examined @ 0009 due to increased discomfort     T(C): 36.9 (03-07-22 @ 23:57), Max: 37.2 (03-07-22 @ 22:47)  HR: 91 (03-08-22 @ 00:25) (86 - 92)  BP: 111/68 (03-07-22 @ 23:58) (110/66 - 124/69)  RR: 16 (03-07-22 @ 22:47) (16 - 16)  SpO2: 89% (03-08-22 @ 00:25) (89% - 99%)

## 2022-03-08 NOTE — OB RN PATIENT PROFILE - FALL HARM RISK - UNIVERSAL INTERVENTIONS
Bed in lowest position, wheels locked, appropriate side rails in place/Call bell, personal items and telephone in reach/Instruct patient to call for assistance before getting out of bed or chair/Non-slip footwear when patient is out of bed/[place] to call system/Physically safe environment - no spills, clutter or unnecessary equipment/Purposeful Proactive Rounding/Room/bathroom lighting operational, light cord in reach

## 2022-03-08 NOTE — DISCHARGE NOTE OB - NS MD DC FALL RISK RISK
For information on Fall & Injury Prevention, visit: [email or web address] OR  [email or web address] OR  https://www.cdc.gov/steadi/patient.html

## 2022-03-08 NOTE — OB PROVIDER LABOR PROGRESS NOTE - NS_OBIHIFHRDETAILS_OBGYN_ALL_OB_FT
150/mod/(-)accels/prolonged decel
150/mod/(+)accels/(+)intermittent variable decels
baseline 150, +accels, +variables

## 2022-03-08 NOTE — DISCHARGE NOTE OB - MEDICATION SUMMARY - MEDICATIONS TO TAKE
I will START or STAY ON the medications listed below when I get home from the hospital:    acetaminophen 325 mg oral tablet  -- 3 tab(s) by mouth every 6 hours  -- Indication: For     ibuprofen 600 mg oral tablet  -- 1 tab(s) by mouth every 6 hours  -- Indication: For     Prenatal Multivitamins with Folic Acid 1 mg oral tablet  -- 1 tab(s) by mouth once a day  -- Indication: For

## 2022-03-08 NOTE — OB PROVIDER DELIVERY SUMMARY - NSSELHIDDEN_OBGYN_ALL_OB_FT
[NS_DeliveryAttending1_OBGYN_ALL_OB_FT:[id number]],[NS_DeliveryAssist1_OBGYN_ALL_OB_FT:[id number]]

## 2022-03-08 NOTE — DISCHARGE NOTE OB - CARE PROVIDER_API CALL
Sarkis Hoover)  Obstetrics and Gynecology  [address] [place] [address]  Phone: [phone]  Fax: [phone]  Follow Up Time:

## 2022-03-08 NOTE — OB PROVIDER DELIVERY SUMMARY - NSPROVIDERDELIVERYNOTE_OBGYN_ALL_OB_FT
Spontaneous vaginal delivery of liveborn infant from [place] position. Head, shoulders, and body delivered easily. Infant passed to mother. Cord was clamped and cut. Placenta delivered spontaneously, noted to be intact. Fundal massage was given and uterine fundus was found to be firm. Vaginal exam revealed intact cervix, sulci, vaginal walls. Perineum with second degree laceration, repaired in standard fashion with chromic suture. Excellent hemostasis was noted. Patient stable. Count correct x 2. Spontaneous vaginal delivery of liveborn infant from [place] position. Head, shoulders, and body delivered easily. Infant passed to mother. Cord was clamped and cut after 60s delay. Placenta delivered spontaneously, noted to be intact. Fundal massage was given and uterine fundus was found to be firm. Vaginal exam revealed intact cervix, sulci, vaginal walls. Perineum with second degree laceration, repaired in standard fashion with chromic suture. Excellent hemostasis was noted. Patient stable. Count correct x 2. Baby to NBN.    Ob attending    agree with note above  Rectum intact. Repair completed with excellent hemostasis and restoration of anatomy.    Meet LIRA

## 2022-03-08 NOTE — DISCHARGE NOTE OB - MATERIALS PROVIDED
Vaccinations/[facility]  Screening Program/  Immunization Record/Breastfeeding Log/Breastfeeding Mother’s Support Group Information/Guide to Postpartum Care/[facility] Hearing Screen Program/Back To Sleep Handout/Shaken Baby Prevention Handout/Breastfeeding Guide and Packet/Birth Certificate Instructions

## 2022-03-08 NOTE — DISCHARGE NOTE OB - CARE PROVIDERS DIRECT ADDRESSES
Patient returning call.  Patient is requesting epidural as soon as possible because she's in a lot of pain.  [phone]   ,DirectAddress_Unknown

## 2022-03-08 NOTE — OB PROVIDER LABOR PROGRESS NOTE - ASSESSMENT
#Labor   - Will continue to manage expectantly and re-assess after epi     #Fetal Wellbeing   - Cat 2. Will continue to monitor. Overall reassuring     # Issues   - c/w Amp for GBS ppx     #Pain Control   - For Epidural     Remberto Langford, PGY-1    d/w Dr. Sommer

## 2022-03-08 NOTE — OB PROVIDER LABOR PROGRESS NOTE - ASSESSMENT
-fully dilated w/ forebag to be ruptured   -prepare for     d/w Dr. Sommer  [facility] PGY3

## 2022-03-08 NOTE — OB RN DELIVERY SUMMARY - NS_SEPSISRSKCALC_OBGYN_ALL_OB_FT
EOS calculated successfully. EOS Risk Factor: 0.5/1000 live births ([facility] national incidence); GA=39w4d; Temp=99; ROM=1.967; GBS='Positive'; Antibiotics='GBS specific antibiotics > 2 hrs prior to birth'

## 2022-03-08 NOTE — OB PROVIDER LABOR PROGRESS NOTE - ASSESSMENT
#Labor   - Being managed expectantly  - Will consider AROM once tracing recovers     #Fetal Wellbeing   - Cat 2. Likely 2/2 to hypotension in the setting of a recent epi. Tracing improved with fluid bolus     # Issues   - GBS pos, c/w Amp     #Pain Control   - s/p Epidural     Remberto Langford, PGY-1    Plan per Dr. Sommer  #Labor   - Being managed expectantly  - Will consider AROM once tracing recovers     #Fetal Wellbeing   - Cat 2. Likely 2/2 to hypotension in the setting of a recent epi. Tracing improved with fluid bolus     # Issues   - GBS pos, c/w Amp     #Pain Control   - s/p Epidural     Remberto Langford, PGY-1    Plan per Dr. Sommer     0151: AROM clear. 9.5/100/-1.     d/w Dr. Sommer

## 2022-03-08 NOTE — OB RN PATIENT PROFILE - PRETERM DELIVERIES, OB PROFILE
· Assessment		  89M PMH CAD/CABG/remote stents, Parkinson disease, HTN, HLD, prior DVT s/p IVCF, prior GIB, CKD III presents with multiple falls and lightheadedness, admitted with gait abnormality, failure to thrive, and an unsafe living situation.     Gait abnormality --generalized weakness, likely in the setting of deconditioning, Parkinson disease, and upper respiratory infection  --PT consult    Failure to thrive in adult --PT consult    Parkinsons Disease.  --c/w home carbidopa/levodopa/entecapone   --home Nuplazid not available--will have to be brought in from home.   --Neurology evaluation pending .  --B12, Folate, TSH    CAD (Coronary Artery Disease). --c/w aspirin and statin.  Cardiology evaluation Dr. Francis.    HTN (Hypertension). --c/w home metoprolol and imdur. Avoid aggressive BP goal in Parkinson disease due to autonomic instability.     Hyperlipidemia.--c/w statin.    Need for prophylactic measure.--VTE PPX lovenox. 0

## 2022-03-08 NOTE — OB RN DELIVERY SUMMARY - NSSELHIDDEN_OBGYN_ALL_OB_FT
[NS_DeliveryAttending1_OBGYN_ALL_OB_FT:[id number]],[NS_DeliveryAssist1_OBGYN_ALL_OB_FT:[id number]],[NS_DeliveryRN_OBGYN_ALL_OB_FT:[id number]=]

## 2022-03-08 NOTE — DISCHARGE NOTE OB - CARE PLAN
Principal Discharge DX:	 (normal spontaneous vaginal delivery)  Assessment and plan of treatment:	recovery   1

## 2022-03-08 NOTE — OB RN PATIENT PROFILE - AS HEIGHT TYPE
HPI:  77yo lady with a PMH of HTN, Aflutter on Xarelto; admitted c/o BLE edema w/ weeping X 1 week with difficulty walking, associated with intermittent dyspnea on exertion; patient is normally on lasix  but two days ago started to get pain and erythema with increased swelling. She denies fever/chills.  Denied chest pain/palpitations/syncope.  In ER, troponin mildly elevated but found to be in Aflutter with RVR.... HRs to 120-130s.  Given bbs/lasix... feeling much better.  LE doppler negative for DVT.    Of note, pt with similar admission to [facility] 7/2017.... edema NOT 2/2 CHF but 2/2 uncontrolled flutter.  LVEF 60% at that time.  D/c'd home then with medical therapy... bbs and anticoagulation.      PAST MEDICAL & SURGICAL HISTORY:  Bronchitis  Obesity (BMI 30-39.9)  HTN (hypertension)  Cyst, ovarian  S/P dilatation and curettage: in 1994  angiogram of RLE to R/O DVT-  ~ 3-4 years ago- Negative test: angiogram of RLE to R/O DVT-  ~ 3-4 years ago- Negative test      INTERVAL HISTORY:  	  Vitals:  Vital Signs Last 24 Hrs  T(C): 37.1 (07 Oct 2017 10:59), Max: 37.3 (07 Oct 2017 00:24)  T(F): 98.8 (07 Oct 2017 10:59), Max: 99.2 (07 Oct 2017 00:24)  HR: 87 (07 Oct 2017 10:59) (87 - 95)  BP: 102/55 (07 Oct 2017 10:59) (91/50 - 102/55)  RR: 20 (07 Oct 2017 10:59) (18 - 20)  SpO2: 98% (07 Oct 2017 10:59) (97% - 98%)    PHYSICAL EXAM:  Neuro: Awake, responsive  CV: S1 S2 IRReg, Aflutter  Lungs: CTABL anteriorly. dim bibas  GI: Soft, BS +, ND, NT  Extremities: 3+ LE edema L>R    TELEMETRY: A-flutter 's  	      DIAGNOSTIC TESTING:    [ ] Echocardiogram:  < from: Transthoracic Echocardiogram (07.19.17 @ 14:49) >    Ejection Fraction (Teicholtz): 66 %    CONCLUSIONS:  1. Mitral annular calcification, otherwise normal mitral  valve. Mild mitral regurgitation.  2. Mildly dilated left atrium.  LA volume index = 35 cc/m2.  3. Normal left ventricular internal dimensions and wall  thicknesses.  4. Normal left ventricular systolic function. No segmental  wall motion abnormalities.  5. Normal right ventricular size and function.  6. Estimated right ventricular systolic pressure equals 42  mm Hg, assuming right atrial pressure equals 10 mm Hg,  consistent with mild pulmonary hypertension.  ------------------------------------------------------------------------  Confirmed on  7/19/2017 - 15:46:42 by Ángel Green M.D.  ------------------------------------------------------------------------    < end of copied text >    OTHER: 	    LABS:	 	    CARDIAC MARKERS:  Troponin I, Serum: .915 ng/mL (10-05 @ 15:26)  Troponin I, Serum: 1.030 ng/mL (10-05 @ 07:29)  Troponin I, Serum: 1.190 ng/mL (10-05 @ 00:22)  Troponin I, Serum: 1.490 ng/mL (10-04 @ 15:48)                        9.4    22.9  )-----------( 455      ( 07 Oct 2017 08:18 )             27.3       10-07    143  |  105  |  27<H>  ----------------------------<  103<H>  3.7   |  27  |  0.71    Ca    8.2<L>      07 Oct 2017 08:18  Mg     2.3     10-06      proBNP: Serum Pro-Brain Natriuretic Peptide: 3211 pg/mL (10-04 @ 15:48)       Culture - Blood (collected 04 Oct 2017 23:42)  Source: .Blood Blood-Peripheral  Preliminary Report (06 Oct 2017 01:02):    No growth to date.    Culture - Blood (collected 04 Oct 2017 23:23)  Source: .Blood Blood-Peripheral  Preliminary Report (06 Oct 2017 01:02):    No growth to date. stated

## 2022-03-08 NOTE — DISCHARGE NOTE OB - PATIENT PORTAL LINK FT
You can access the FollowMyHealth Patient Portal offered by [facility] by registering at the following website: [email or web address]. By joining [employer]’s FollowMyHealth portal, you will also be able to view your health information using other applications (apps) compatible with our system.

## 2022-03-09 RX ADMIN — Medication 600 MILLIGRAM(S): at 00:23

## 2022-03-09 RX ADMIN — Medication 600 MILLIGRAM(S): at 00:53

## 2022-03-09 RX ADMIN — Medication 600 MILLIGRAM(S): at 05:48

## 2022-03-09 RX ADMIN — Medication 600 MILLIGRAM(S): at 05:18

## 2022-03-09 RX ADMIN — Medication 600 MILLIGRAM(S): at 22:12

## 2022-03-09 RX ADMIN — SODIUM CHLORIDE 3 MILLILITER(S): 9 INJECTION INTRAMUSCULAR; INTRAVENOUS; SUBCUTANEOUS at 22:12

## 2022-03-09 RX ADMIN — Medication 600 MILLIGRAM(S): at 21:17

## 2022-03-09 NOTE — PROGRESS NOTE ADULT - SUBJECTIVE AND OBJECTIVE BOX
S: Patient doing well. Minimal lochia. Pain controlled.  Post Partum day : 1  O: Vital Signs Last 24 Hrs  T(C): 36.6 (08 Mar 2022 17:45), Max: 36.7 (08 Mar 2022 10:19)  T(F): 97.8 (08 Mar 2022 17:45), Max: 98.1 (08 Mar 2022 14:14)  HR: 76 (08 Mar 2022 17:45) (74 - 87)  BP: 103/61 (08 Mar 2022 17:45) (88/64 - 103/61)  BP(mean): --  RR: 16 (08 Mar 2022 17:45) (16 - 18)  SpO2: 98% (08 Mar 2022 17:45) (98% - 100%)    Gen: No acute distress  Abd: soft, NT,  fundus firm below umbilicus  Lochia:Normal  Ext: no tenderness    Labs:                        12.2   12.57 )-----------( 221      ( 08 Mar 2022 00:22 )             35.0       A: 36y PPD # 1 s/p  doing well.    Plan: Routine care, discharge home tomorrow.

## 2022-03-10 VITALS
OXYGEN SATURATION: 98 % | TEMPERATURE: 98 F | DIASTOLIC BLOOD PRESSURE: 65 MMHG | RESPIRATION RATE: 18 BRPM | HEART RATE: 62 BPM | SYSTOLIC BLOOD PRESSURE: 98 MMHG

## 2022-03-10 RX ADMIN — Medication 600 MILLIGRAM(S): at 12:13

## 2022-03-10 RX ADMIN — Medication 600 MILLIGRAM(S): at 11:42

## 2022-03-10 NOTE — PROGRESS NOTE ADULT - SUBJECTIVE AND OBJECTIVE BOX
Post partum Day 2      Pt without complaints    Vital Signs Last 24 Hrs  T(C): 36.6 (09 Mar 2022 17:52), Max: 36.6 (09 Mar 2022 17:52)  T(F): 97.9 (09 Mar 2022 17:52), Max: 97.9 (09 Mar 2022 17:52)  HR: 79 (09 Mar 2022 17:52) (77 - 79)  BP: 106/65 (09 Mar 2022 17:52) (104/64 - 106/65)  BP(mean): --  RR: 17 (09 Mar 2022 17:52) (16 - 17)  SpO2: 99% (09 Mar 2022 17:52) (99% - 99%)    MEDICATIONS  (STANDING):  acetaminophen     Tablet .. 975 milliGRAM(s) Oral <User Schedule>  diphtheria/tetanus/pertussis (acellular) Vaccine (ADAcel) 0.5 milliLiter(s) IntraMuscular once  ibuprofen  Tablet. 600 milliGRAM(s) Oral every 6 hours  ketorolac   Injectable 30 milliGRAM(s) IV Push once  lactated ringers Bolus 500 milliLiter(s) IV Bolus once  oxytocin Infusion 333.333 milliUNIT(s)/Min (1000 mL/Hr) IV Continuous <Continuous>  oxytocin Infusion 333.333 milliUNIT(s)/Min (1000 mL/Hr) IV Continuous <Continuous>  prenatal multivitamin 1 Tablet(s) Oral daily  sodium chloride 0.9% lock flush 3 milliLiter(s) IV Push every 8 hours    MEDICATIONS  (PRN):  benzocaine 20%/menthol 0.5% Spray 1 Spray(s) Topical every 6 hours PRN for Perineal discomfort  dibucaine 1% Ointment 1 Application(s) Topical every 6 hours PRN Perineal discomfort  diphenhydrAMINE 25 milliGRAM(s) Oral every 6 hours PRN Pruritus  hydrocortisone 1% Cream 1 Application(s) Topical every 6 hours PRN Moderate Pain (4-6)  lanolin Ointment 1 Application(s) Topical every 6 hours PRN nipple soreness  magnesium hydroxide Suspension 30 milliLiter(s) Oral two times a day PRN Constipation  oxyCODONE    IR 5 milliGRAM(s) Oral every 3 hours PRN Moderate to Severe Pain (4-10)  oxyCODONE    IR 5 milliGRAM(s) Oral once PRN Moderate to Severe Pain (4-10)  pramoxine 1%/zinc 5% Cream 1 Application(s) Topical every 4 hours PRN Moderate Pain (4-6)  simethicone 80 milliGRAM(s) Chew every 4 hours PRN Gas  witch hazel Pads 1 Application(s) Topical every 4 hours PRN Perineal discomfort      Abdomen soft  fundus firm, non tender  extremities non tender    lochia wnl      Patient doing well  Routine post partum care

## 2022-03-28 PROBLEM — U07.1 COVID-19: Chronic | Status: ACTIVE | Noted: 2022-03-07

## 2022-03-30 ENCOUNTER — APPOINTMENT (OUTPATIENT)
Dept: OBGYN | Facility: CLINIC | Age: 37
End: 2022-03-30

## 2022-03-30 DIAGNOSIS — N92.5 OTHER SPECIFIED IRREGULAR MENSTRUATION: ICD-10-CM

## 2022-03-30 DIAGNOSIS — E28.2 POLYCYSTIC OVARIAN SYNDROME: ICD-10-CM

## 2022-03-30 DIAGNOSIS — Z78.9 OTHER SPECIFIED HEALTH STATUS: ICD-10-CM

## 2022-03-30 PROBLEM — Z00.00 ENCOUNTER FOR PREVENTIVE HEALTH EXAMINATION: Status: ACTIVE | Noted: 2022-03-30

## 2022-03-30 RX ORDER — PNV/FERROUS SULFATE/FOLIC ACID 27-<0.5MG
TABLET ORAL
Refills: 0 | Status: ACTIVE | COMMUNITY

## 2022-04-05 NOTE — PATIENT PROFILE OB - SURGICAL SITE INCISION
Remy aLu)  Urology  [address], [facility], [place] [address]  Phone: [phone]  Fax: [phone]  Follow Up Time:   
no

## 2022-04-20 ENCOUNTER — APPOINTMENT (OUTPATIENT)
Dept: OBGYN | Facility: CLINIC | Age: 37
End: 2022-04-20
Payer: COMMERCIAL

## 2022-04-20 VITALS
OXYGEN SATURATION: 99 % | WEIGHT: 126 LBS | DIASTOLIC BLOOD PRESSURE: 66 MMHG | SYSTOLIC BLOOD PRESSURE: 98 MMHG | TEMPERATURE: 98 F | BODY MASS INDEX: 23.19 KG/M2 | HEART RATE: 83 BPM | RESPIRATION RATE: 16 BRPM | HEIGHT: 62 IN

## 2022-04-20 PROCEDURE — 99213 OFFICE O/P EST LOW 20 MIN: CPT

## 2022-04-20 RX ORDER — NORETHINDRONE ACETATE AND ETHINYL ESTRADIOL 1; 20 MG/1; UG/1
1-20 TABLET ORAL DAILY
Qty: 28 | Refills: 6 | Status: ACTIVE | COMMUNITY
Start: 2022-04-20 | End: 1900-01-01

## 2022-04-20 NOTE — HISTORY OF PRESENT ILLNESS
[FreeTextEntry1] : 36 year G 3  P \par 2 \par  2022 @ [facility]\par Not nursing\par No Medication.\par Want's OCP's. \par No family h/o DVT. \par OCP Counseling done. \par Non smoker. \par

## 2022-04-20 NOTE — PHYSICAL EXAM
[Soft] : soft [Non-tender] : non-tender [Examination Of The Breasts] : a normal appearance [No Masses] : no breast masses were palpable [Labia Majora] : normal [Labia Minora] : normal [Normal] : normal [Uterine Adnexae] : normal

## 2022-05-05 ENCOUNTER — APPOINTMENT (OUTPATIENT)
Dept: OBGYN | Facility: CLINIC | Age: 37
End: 2022-05-05
Payer: COMMERCIAL

## 2022-05-05 VITALS
OXYGEN SATURATION: 97 % | BODY MASS INDEX: 23.19 KG/M2 | SYSTOLIC BLOOD PRESSURE: 98 MMHG | DIASTOLIC BLOOD PRESSURE: 64 MMHG | HEART RATE: 79 BPM | WEIGHT: 126 LBS | RESPIRATION RATE: 16 BRPM | TEMPERATURE: 97.7 F | HEIGHT: 62 IN

## 2022-05-05 DIAGNOSIS — N63.25 UNSP LUMP IN THE LT BREAST, OVERLAPPING QUADRANTS: ICD-10-CM

## 2022-05-05 PROCEDURE — 99213 OFFICE O/P EST LOW 20 MIN: CPT

## 2022-05-05 NOTE — HISTORY OF PRESENT ILLNESS
[FreeTextEntry1] : 36 year G 3  P \par 1 Miscarriage\par 2 \par 1  2022 @ [facility]\par Not Nursing\par No Medication\par C/O Left breast pea size lump & discoloration x 1 week. \par No family h/o breast cancer.

## 2022-05-05 NOTE — PHYSICAL EXAM
[Soft] : soft [Non-tender] : non-tender [Non-distended] : non-distended [___] : a [unfilled] ~Ucm area of erythema [Tenderness Of The Left Breast] : tenderness [___cm] : a ~M [unfilled] ~Ucm superior medial quadrant mass was palpated

## 2022-07-27 ENCOUNTER — APPOINTMENT (OUTPATIENT)
Dept: OBGYN | Facility: CLINIC | Age: 37
End: 2022-07-27

## 2022-07-27 VITALS
HEIGHT: 62 IN | DIASTOLIC BLOOD PRESSURE: 70 MMHG | BODY MASS INDEX: 23.19 KG/M2 | WEIGHT: 126 LBS | OXYGEN SATURATION: 97 % | HEART RATE: 73 BPM | TEMPERATURE: 98.3 F | RESPIRATION RATE: 16 BRPM | SYSTOLIC BLOOD PRESSURE: 105 MMHG

## 2022-07-27 DIAGNOSIS — N92.6 IRREGULAR MENSTRUATION, UNSPECIFIED: ICD-10-CM

## 2022-07-27 PROCEDURE — 99395 PREV VISIT EST AGE 18-39: CPT

## 2022-07-27 PROCEDURE — 36415 COLL VENOUS BLD VENIPUNCTURE: CPT

## 2022-07-27 RX ORDER — NORETHINDRONE ACETATE AND ETHINYL ESTRADIOL 1; 20 MG/1; UG/1
1-20 TABLET ORAL ONCE
Qty: 84 | Refills: 3 | Status: ACTIVE | COMMUNITY
Start: 2022-07-27 | End: 1900-01-01

## 2022-07-27 NOTE — HISTORY OF PRESENT ILLNESS
[FreeTextEntry1] : SANDER ZARAGOZA 37 year, presents for Annual & cONTRACEPTION. \par G 3   P \par 1 Miscarriage & 2 \par LMP 2022 - Irregular Menses\par After stopping nursing. \par Started OCP's & was bleeding irregularly. \par Hence stopped OCP's early this month. \par Urine-UCG Negative @ home. \par Sexually active, with condom\par No Medication. \par OCP counseling done. \par No family h/o breast cancer or DVT\par

## 2022-07-28 LAB
BASOPHILS # BLD AUTO: 0.05 K/UL
BASOPHILS NFR BLD AUTO: 1 %
C TRACH RRNA SPEC QL NAA+PROBE: NOT DETECTED
EOSINOPHIL # BLD AUTO: 0.11 K/UL
EOSINOPHIL NFR BLD AUTO: 2.2 %
HCG SERPL-MCNC: <1 MIU/ML
HCT VFR BLD CALC: 40.1 %
HGB BLD-MCNC: 13 G/DL
IMM GRANULOCYTES NFR BLD AUTO: 0.2 %
LYMPHOCYTES # BLD AUTO: 1.86 K/UL
LYMPHOCYTES NFR BLD AUTO: 36.8 %
MAN DIFF?: NORMAL
MCHC RBC-ENTMCNC: 29.2 PG
MCHC RBC-ENTMCNC: 32.4 GM/DL
MCV RBC AUTO: 90.1 FL
MONOCYTES # BLD AUTO: 0.37 K/UL
MONOCYTES NFR BLD AUTO: 7.3 %
N GONORRHOEA RRNA SPEC QL NAA+PROBE: NOT DETECTED
NEUTROPHILS # BLD AUTO: 2.65 K/UL
NEUTROPHILS NFR BLD AUTO: 52.5 %
PLATELET # BLD AUTO: 388 K/UL
RBC # BLD: 4.45 M/UL
RBC # FLD: 12.3 %
SOURCE TP AMPLIFICATION: NORMAL
WBC # FLD AUTO: 5.05 K/UL

## 2022-08-01 LAB — CYTOLOGY CVX/VAG DOC THIN PREP: NORMAL

## 2022-08-23 NOTE — PATIENT PROFILE OB - SPIRITUAL CULTURAL, RELIGIOUS PRACTICES/VALUES, PROFILE
Detail Level: Detailed Quality 130: Documentation Of Current Medications In The Medical Record: Current Medications Documented n/a

## 2023-05-10 NOTE — OB PROVIDER TRIAGE NOTE - INTERNATIONAL TRAVEL
Called a nd spoke with [employer] and let her know this was already faxed to [facility]. She states that was the [facility]  and she is her COA  and they work together.   The rx was faxed to [employer] as well to [phone]
Pt had her PT Eval and it is determined that she will require a Rollator walker and a shower chair. Please fax script to [phone] . Pt also need a Referral to Pain Management.
No

## 2023-05-11 RX ORDER — NORETHINDRONE ACETATE AND ETHINYL ESTRADIOL AND FERROUS FUMARATE 1MG-20(21)
1-20 KIT ORAL
Qty: 1 | Refills: 6 | Status: ACTIVE | COMMUNITY
Start: 2023-05-11 | End: 1900-01-01

## 2023-07-05 RX ORDER — NORETHINDRONE ACETATE AND ETHINYL ESTRADIOL AND FERROUS FUMARATE 1MG-20(21)
1-20 KIT ORAL
Qty: 28 | Refills: 1 | Status: ACTIVE | COMMUNITY
Start: 2023-07-05 | End: 1900-01-01

## 2023-09-25 ENCOUNTER — APPOINTMENT (OUTPATIENT)
Dept: OBGYN | Facility: CLINIC | Age: 38
End: 2023-09-25

## 2023-09-25 ENCOUNTER — RX RENEWAL (OUTPATIENT)
Age: 38
End: 2023-09-25

## 2023-10-18 ENCOUNTER — APPOINTMENT (OUTPATIENT)
Dept: OBGYN | Facility: CLINIC | Age: 38
End: 2023-10-18
Payer: COMMERCIAL

## 2023-10-18 VITALS
RESPIRATION RATE: 16 BRPM | SYSTOLIC BLOOD PRESSURE: 92 MMHG | DIASTOLIC BLOOD PRESSURE: 66 MMHG | HEIGHT: 62 IN | BODY MASS INDEX: 21.35 KG/M2 | HEART RATE: 74 BPM | WEIGHT: 116 LBS | OXYGEN SATURATION: 98 % | TEMPERATURE: 98.2 F

## 2023-10-18 DIAGNOSIS — Z01.419 ENCOUNTER FOR GYNECOLOGICAL EXAMINATION (GENERAL) (ROUTINE) W/OUT ABNORMAL FINDINGS: ICD-10-CM

## 2023-10-18 DIAGNOSIS — Z30.019 ENCOUNTER FOR INITIAL PRESCRIPTION OF CONTRACEPTIVES, UNSPECIFIED: ICD-10-CM

## 2023-10-18 PROCEDURE — 99395 PREV VISIT EST AGE 18-39: CPT

## 2023-10-18 RX ORDER — NORETHINDRONE ACETATE AND ETHINYL ESTRADIOL AND FERROUS FUMARATE 1MG-20(21)
1-20 KIT ORAL
Qty: 3 | Refills: 3 | Status: ACTIVE | COMMUNITY
Start: 2023-10-18 | End: 1900-01-01

## 2023-10-19 LAB
C TRACH RRNA SPEC QL NAA+PROBE: NOT DETECTED
N GONORRHOEA RRNA SPEC QL NAA+PROBE: NOT DETECTED
SOURCE TP AMPLIFICATION: NORMAL

## 2023-10-23 LAB — CYTOLOGY CVX/VAG DOC THIN PREP: NORMAL

## 2023-12-03 NOTE — OB RN PATIENT PROFILE - PRO BLOOD TYPE INFANT
" Case: [id number] Date/Time: 23    Procedure:  SECTION, PRIMARY (Abdomen)    Anesthesia type: Spinal    Pre-op diagnosis: Breech    Location: [facility] OR 01 / SURGERY LABOR AND DELIVERY    Surgeons: ORVILLE Koo H&P:  PAST MEDICAL HISTORY:   23 y.o. female who presents for Procedure(s):   SECTION, PRIMARY.  She has current and past medical problems significant for:    No past medical history on file.    SMOKING/ALCOHOL/RECREATIONAL DRUG USE:  Social History     Tobacco Use    Smoking status: Never    Smokeless tobacco: Never   Vaping Use    Vaping Use: Never used   Substance Use Topics    Alcohol use: Not Currently    Drug use: Not Currently     Types: Marijuana, Inhaled     Comment: 2020     Social History     Substance and Sexual Activity   Drug Use Not Currently    Types: Marijuana, Inhaled    Comment: 2020       PAST SURGICAL HISTORY:  Past Surgical History:   Procedure Laterality Date    OPEN REDUCTION      (L) wrist radius 2018       ALLERGIES:   Allergies   Allergen Reactions    Asa [Aspirin] Shortness of Breath     \"can't breathe\"        MEDICATIONS:  No current facility-administered medications on file prior to encounter.     Current Outpatient Medications on File Prior to Encounter   Medication Sig Dispense Refill    Prenatal Vit-Fe Fumarate-FA (PRENATAL PO) Take 1 Tab by mouth every day. From [facility]         LABS:  Lab Results   Component Value Date/Time    HEMOGLOBIN 13.2 2023 0720    HEMATOCRIT 39.3 2023 0720    WBC 7.4 2023 0720     Lab Results   Component Value Date/Time    SODIUM 136 2021 1530    POTASSIUM 4.0 2021 1530    CHLORIDE 102 2021 1530    CO2 22 2021 1530    GLUCOSE 88 2021 1530    BUN 7 (L) 2021 1530    CALCIUM 9.9 2021 1530         PREVIOUS ANESTHETICS: See EMR  __________________________________________    Relevant Problems   No relevant active problems       Physical " Exam    Airway   Mallampati: II  TM distance: >3 FB  Neck ROM: full       Cardiovascular - normal exam  Rhythm: regular  Rate: normal  (-) murmur     Dental - normal exam           Pulmonary - normal exam  Breath sounds clear to auscultation     Abdominal    Neurological - normal exam                   Anesthesia Plan    ASA 2- EMERGENT (Breech, feet seen in vagina)   ASA physical status emergent criteria: other (comment)    Plan - spinal   Neuraxial block will be primary anesthetic                Postoperative Plan: Postoperative administration of opioids is intended.    Pertinent diagnostic labs and testing reviewed    Informed Consent:    Anesthetic plan and risks discussed with patient.           A positive

## 2024-01-29 ENCOUNTER — APPOINTMENT (OUTPATIENT)
Dept: OBGYN | Facility: CLINIC | Age: 39
End: 2024-01-29
Payer: COMMERCIAL

## 2024-01-29 ENCOUNTER — TRANSCRIPTION ENCOUNTER (OUTPATIENT)
Age: 39
End: 2024-01-29

## 2024-01-29 ENCOUNTER — ASOB RESULT (OUTPATIENT)
Age: 39
End: 2024-01-29

## 2024-01-29 VITALS
WEIGHT: 122 LBS | DIASTOLIC BLOOD PRESSURE: 64 MMHG | RESPIRATION RATE: 16 BRPM | TEMPERATURE: 98.1 F | HEIGHT: 62 IN | HEART RATE: 78 BPM | SYSTOLIC BLOOD PRESSURE: 98 MMHG | BODY MASS INDEX: 22.45 KG/M2 | OXYGEN SATURATION: 100 %

## 2024-01-29 DIAGNOSIS — Z32.00 ENCOUNTER FOR PREGNANCY TEST, RESULT UNKNOWN: ICD-10-CM

## 2024-01-29 PROCEDURE — 76830 TRANSVAGINAL US NON-OB: CPT

## 2024-01-29 PROCEDURE — 99213 OFFICE O/P EST LOW 20 MIN: CPT

## 2024-01-29 PROCEDURE — ZZZZZ: CPT

## 2024-01-29 NOTE — HISTORY OF PRESENT ILLNESS
[FreeTextEntry1] :  SANDER ZARAGOZA 38 year, presents for delayed Meses.     -   1 miscarriage.  LMP: 2023?? Irregular menses.  Stopped OCP's in 2023, had only spotting in 2023.  Thinks conceived around 23.  UCG Positive @ home on  Medication: PNV.  No health issues, Nonsmoker.  Has nausea, no vomiting. Diet discussion.  For dating sonogram today.

## 2024-01-29 NOTE — PROCEDURE
[Transvaginal OB Sonogram] : Transvaginal OB Sonogram [Intrauterine Pregnancy] : intrauterine pregnancy [Yolk Sac] : yolk sac present [Fetal Heart] : fetal heart present [Date: ___] : Date: [unfilled] [Current GA by Sonogram: ___ (wks)] : Current GA by Sonogram: [unfilled]Uwks [___ day(s)] : [unfilled] days [Transvaginal OB Sonogram WNL] : Transvaginal OB Sonogram WNL [FreeTextEntry1] : Viable SIUP @ 8.2 weeks.

## 2024-02-19 ENCOUNTER — ASOB RESULT (OUTPATIENT)
Age: 39
End: 2024-02-19

## 2024-02-19 ENCOUNTER — APPOINTMENT (OUTPATIENT)
Dept: OBGYN | Facility: CLINIC | Age: 39
End: 2024-02-19
Payer: COMMERCIAL

## 2024-02-19 VITALS
HEART RATE: 67 BPM | BODY MASS INDEX: 22.45 KG/M2 | OXYGEN SATURATION: 99 % | WEIGHT: 122 LBS | DIASTOLIC BLOOD PRESSURE: 68 MMHG | HEIGHT: 62 IN | RESPIRATION RATE: 16 BRPM | SYSTOLIC BLOOD PRESSURE: 100 MMHG

## 2024-02-19 DIAGNOSIS — Z3A.12 12 WEEKS GESTATION OF PREGNANCY: ICD-10-CM

## 2024-02-19 DIAGNOSIS — Z71.7 HUMAN IMMUNODEFICIENCY VIRUS [HIV] COUNSELING: ICD-10-CM

## 2024-02-19 PROCEDURE — 36415 COLL VENOUS BLD VENIPUNCTURE: CPT

## 2024-02-19 PROCEDURE — 99214 OFFICE O/P EST MOD 30 MIN: CPT

## 2024-02-19 PROCEDURE — 76830 TRANSVAGINAL US NON-OB: CPT

## 2024-02-19 NOTE — PROCEDURE
[Transvaginal OB Sonogram] : Transvaginal OB Sonogram [Transabdominal OB Sonogram] : Transabdominal OB Sonogram [Intrauterine Pregnancy] : intrauterine pregnancy [Fetal Heart] : fetal heart present [Date: ___] : Date: [unfilled] [Current GA by Sonogram: ___ (wks)] : Current GA by Sonogram: [unfilled]Uwks [___ day(s)] : [unfilled] days [Transvaginal OB Sonogram WNL] : Transvaginal OB Sonogram WNL [FreeTextEntry1] : Viable SIUP @ 11.6 Weeks.

## 2024-02-19 NOTE — HISTORY OF PRESENT ILLNESS
[FreeTextEntry1] : SANDER ZARAGOZA 37 YO, presents for Sonogram & Bloodwork G 3   P  - 1  1 Miscarriage LMP: 23 - Irregular enses Medication: PNV. No complaints.  HIV counseling done & blood drawn for PNP & Verify.  For FTS/NT @ [facility] by 24. Amniocentesis & [facility] discussion.

## 2024-02-20 ENCOUNTER — NON-APPOINTMENT (OUTPATIENT)
Age: 39
End: 2024-02-20

## 2024-02-20 LAB
ABO + RH PNL BLD: NORMAL
BLD GP AB SCN SERPL QL: NORMAL
ESTIMATED AVERAGE GLUCOSE: 100 MG/DL
GLUCOSE SERPL-MCNC: 80 MG/DL
HBA1C MFR BLD HPLC: 5.1 %
HBV SURFACE AG SER QL: NONREACTIVE
HCT VFR BLD CALC: 40.8 %
HCV AB SER QL: NONREACTIVE
HCV S/CO RATIO: 0.09 S/CO
HGB A MFR BLD: 96.7 %
HGB A2 MFR BLD: 2.8 %
HGB BLD-MCNC: 13.6 G/DL
HGB F MFR BLD: 0.5 %
HGB FRACT BLD-IMP: NORMAL
HIV1+2 AB SPEC QL IA.RAPID: NONREACTIVE
MCHC RBC-ENTMCNC: 29.4 PG
MCHC RBC-ENTMCNC: 33.3 GM/DL
MCV RBC AUTO: 88.1 FL
MEV IGG FLD QL IA: 269 AU/ML
MEV IGG+IGM SER-IMP: POSITIVE
PLATELET # BLD AUTO: 285 K/UL
RBC # BLD: 4.63 M/UL
RBC # FLD: 12.4 %
RUBV IGG FLD-ACNC: 1.7 INDEX
RUBV IGG SER-IMP: POSITIVE
T PALLIDUM AB SER QL IA: NEGATIVE
WBC # FLD AUTO: 8.73 K/UL

## 2024-02-21 LAB — BACTERIA UR CULT: NORMAL

## 2024-02-23 LAB
AR GENE MUT ANL BLD/T: NORMAL
FMR1 GENE MUT ANL BLD/T: NORMAL

## 2024-02-24 LAB
CHROMOSOME13 INTERPRETATION: NORMAL
CHROMOSOME13 TEST RESULT: NORMAL
CHROMOSOME18 INTERPRETATION: NORMAL
CHROMOSOME18 TEST RESULT: NORMAL
CHROMOSOME21 INTERPRETATION: NORMAL
CHROMOSOME21 TEST RESULT: NORMAL
FETAL FRACTION: NORMAL
MICRODELETIONS INTERPRETATION: NORMAL
MICRODELETIONS RESULT: NORMAL
PERFORMANCE AND LIMITATIONS: NORMAL
SEX CHROMOSOME INTERPRETATION: NORMAL
SEX CHROMOSOME TEST RESULT: NORMAL
VERIFI WITH MICRODELETIONS: NOT DETECTED

## 2024-02-26 LAB — CFTR MUT TESTED BLD/T: NEGATIVE

## 2024-03-01 ENCOUNTER — APPOINTMENT (OUTPATIENT)
Dept: ANTEPARTUM | Facility: CLINIC | Age: 39
End: 2024-03-01
Payer: COMMERCIAL

## 2024-03-01 ENCOUNTER — ASOB RESULT (OUTPATIENT)
Age: 39
End: 2024-03-01

## 2024-03-01 ENCOUNTER — LABORATORY RESULT (OUTPATIENT)
Age: 39
End: 2024-03-01

## 2024-03-01 PROCEDURE — 76813 OB US NUCHAL MEAS 1 GEST: CPT

## 2024-03-01 PROCEDURE — 76801 OB US < 14 WKS SINGLE FETUS: CPT | Mod: 59

## 2024-03-06 ENCOUNTER — APPOINTMENT (OUTPATIENT)
Dept: OBGYN | Facility: CLINIC | Age: 39
End: 2024-03-06
Payer: COMMERCIAL

## 2024-03-06 PROCEDURE — 99441: CPT

## 2024-03-18 ENCOUNTER — APPOINTMENT (OUTPATIENT)
Dept: OBGYN | Facility: CLINIC | Age: 39
End: 2024-03-18
Payer: COMMERCIAL

## 2024-03-18 VITALS
WEIGHT: 125 LBS | SYSTOLIC BLOOD PRESSURE: 110 MMHG | DIASTOLIC BLOOD PRESSURE: 72 MMHG | OXYGEN SATURATION: 99 % | BODY MASS INDEX: 23 KG/M2 | HEIGHT: 62 IN | HEART RATE: 86 BPM

## 2024-03-18 PROCEDURE — 0501F PRENATAL FLOW SHEET: CPT

## 2024-03-18 PROCEDURE — 36415 COLL VENOUS BLD VENIPUNCTURE: CPT

## 2024-03-21 LAB
AFP MOM: 1.19
AFP VALUE: 44.5 NG/ML
ALPHA FETOPROTEIN SERUM COMMENT: NORMAL
ALPHA FETOPROTEIN SERUM INTERPRETATION: NORMAL
ALPHA FETOPROTEIN SERUM RESULTS: NORMAL
ALPHA FETOPROTEIN SERUM TEST RESULTS: NORMAL
GESTATIONAL AGE BASED ON: NORMAL
GESTATIONAL AGE ON COLLECTION DATE: 15.9 WEEKS
INSULIN DEP DIABETES: NO
MATERNAL AGE AT EDD AFP: 38.1 YR
MULTIPLE GESTATION: NO
OSBR RISK 1 IN: 6704
RACE: NORMAL
WEIGHT AFP: 125 LBS

## 2024-03-22 ENCOUNTER — ASOB RESULT (OUTPATIENT)
Age: 39
End: 2024-03-22

## 2024-03-22 ENCOUNTER — NON-APPOINTMENT (OUTPATIENT)
Age: 39
End: 2024-03-22

## 2024-03-22 ENCOUNTER — APPOINTMENT (OUTPATIENT)
Dept: ANTEPARTUM | Facility: CLINIC | Age: 39
End: 2024-03-22
Payer: COMMERCIAL

## 2024-03-22 ENCOUNTER — APPOINTMENT (OUTPATIENT)
Dept: MATERNAL FETAL MEDICINE | Facility: CLINIC | Age: 39
End: 2024-03-22
Payer: COMMERCIAL

## 2024-03-22 PROCEDURE — 99214 OFFICE O/P EST MOD 30 MIN: CPT | Mod: 25

## 2024-03-22 PROCEDURE — 99204 OFFICE O/P NEW MOD 45 MIN: CPT | Mod: 25

## 2024-03-22 PROCEDURE — 76815 OB US LIMITED FETUS(S): CPT

## 2024-04-15 ENCOUNTER — APPOINTMENT (OUTPATIENT)
Dept: OBGYN | Facility: CLINIC | Age: 39
End: 2024-04-15
Payer: COMMERCIAL

## 2024-04-15 VITALS
SYSTOLIC BLOOD PRESSURE: 94 MMHG | WEIGHT: 131 LBS | TEMPERATURE: 98 F | HEART RATE: 77 BPM | DIASTOLIC BLOOD PRESSURE: 60 MMHG | HEIGHT: 62 IN | BODY MASS INDEX: 24.11 KG/M2 | OXYGEN SATURATION: 98 %

## 2024-04-15 PROCEDURE — 0502F SUBSEQUENT PRENATAL CARE: CPT

## 2024-04-16 LAB
BILIRUB UR QL STRIP: NEGATIVE
CLARITY UR: CLEAR
COLLECTION METHOD: NORMAL
GLUCOSE UR-MCNC: NEGATIVE
HCG UR QL: 0.2 EU/DL
HGB UR QL STRIP.AUTO: NEGATIVE
KETONES UR-MCNC: NEGATIVE
LEUKOCYTE ESTERASE UR QL STRIP: NEGATIVE
NITRITE UR QL STRIP: NEGATIVE
PH UR STRIP: 8.5
PROT UR STRIP-MCNC: NORMAL
SP GR UR STRIP: 1.02

## 2024-04-19 ENCOUNTER — APPOINTMENT (OUTPATIENT)
Dept: ANTEPARTUM | Facility: CLINIC | Age: 39
End: 2024-04-19
Payer: COMMERCIAL

## 2024-04-19 ENCOUNTER — ASOB RESULT (OUTPATIENT)
Age: 39
End: 2024-04-19

## 2024-04-19 PROCEDURE — 76811 OB US DETAILED SNGL FETUS: CPT

## 2024-05-15 ENCOUNTER — APPOINTMENT (OUTPATIENT)
Dept: OBGYN | Facility: CLINIC | Age: 39
End: 2024-05-15
Payer: COMMERCIAL

## 2024-05-15 VITALS
RESPIRATION RATE: 16 BRPM | DIASTOLIC BLOOD PRESSURE: 62 MMHG | HEIGHT: 62 IN | OXYGEN SATURATION: 98 % | SYSTOLIC BLOOD PRESSURE: 96 MMHG | WEIGHT: 135 LBS | HEART RATE: 86 BPM | BODY MASS INDEX: 24.84 KG/M2

## 2024-05-15 PROCEDURE — 0502F SUBSEQUENT PRENATAL CARE: CPT

## 2024-05-16 LAB
BILIRUB UR QL STRIP: NEGATIVE
CLARITY UR: CLEAR
COLLECTION METHOD: NORMAL
GLUCOSE UR-MCNC: NEGATIVE
HCG UR QL: 0.2 EU/DL
HGB UR QL STRIP.AUTO: NEGATIVE
KETONES UR-MCNC: NEGATIVE
LEUKOCYTE ESTERASE UR QL STRIP: NEGATIVE
NITRITE UR QL STRIP: NEGATIVE
PH UR STRIP: 6.5
PROT UR STRIP-MCNC: NEGATIVE
SP GR UR STRIP: 1.02

## 2024-06-10 ENCOUNTER — NON-APPOINTMENT (OUTPATIENT)
Age: 39
End: 2024-06-10

## 2024-06-10 ENCOUNTER — APPOINTMENT (OUTPATIENT)
Dept: OBGYN | Facility: CLINIC | Age: 39
End: 2024-06-10
Payer: COMMERCIAL

## 2024-06-10 ENCOUNTER — ASOB RESULT (OUTPATIENT)
Age: 39
End: 2024-06-10

## 2024-06-10 VITALS
RESPIRATION RATE: 16 BRPM | HEART RATE: 96 BPM | BODY MASS INDEX: 25.21 KG/M2 | SYSTOLIC BLOOD PRESSURE: 96 MMHG | WEIGHT: 137 LBS | DIASTOLIC BLOOD PRESSURE: 64 MMHG | OXYGEN SATURATION: 99 % | HEIGHT: 62 IN

## 2024-06-10 PROCEDURE — 76819 FETAL BIOPHYS PROFIL W/O NST: CPT

## 2024-06-14 ENCOUNTER — APPOINTMENT (OUTPATIENT)
Dept: OBGYN | Facility: CLINIC | Age: 39
End: 2024-06-14
Payer: COMMERCIAL

## 2024-06-14 VITALS
HEIGHT: 62 IN | OXYGEN SATURATION: 98 % | BODY MASS INDEX: 25.21 KG/M2 | SYSTOLIC BLOOD PRESSURE: 96 MMHG | HEART RATE: 80 BPM | RESPIRATION RATE: 16 BRPM | DIASTOLIC BLOOD PRESSURE: 60 MMHG | WEIGHT: 137 LBS

## 2024-06-14 PROCEDURE — 0502F SUBSEQUENT PRENATAL CARE: CPT

## 2024-06-14 PROCEDURE — 36415 COLL VENOUS BLD VENIPUNCTURE: CPT

## 2024-06-14 PROCEDURE — 99213 OFFICE O/P EST LOW 20 MIN: CPT

## 2024-06-17 ENCOUNTER — APPOINTMENT (OUTPATIENT)
Dept: OBGYN | Facility: CLINIC | Age: 39
End: 2024-06-17
Payer: COMMERCIAL

## 2024-06-17 LAB
GLUCOSE 1H P 50 G GLC PO SERPL-MCNC: 139 MG/DL
HCT VFR BLD CALC: 34 %
HGB BLD-MCNC: 11.3 G/DL
MCHC RBC-ENTMCNC: 30.6 PG
MCHC RBC-ENTMCNC: 33.2 GM/DL
MCV RBC AUTO: 92.1 FL
PLATELET # BLD AUTO: 214 K/UL
RBC # BLD: 3.69 M/UL
RBC # FLD: 12.8 %
WBC # FLD AUTO: 7.83 K/UL

## 2024-06-17 PROCEDURE — 99441: CPT

## 2024-06-18 DIAGNOSIS — Z34.90 ENCOUNTER FOR SUPERVISION OF NORMAL PREGNANCY, UNSPECIFIED, UNSPECIFIED TRIMESTER: ICD-10-CM

## 2024-06-18 LAB — BACTERIA UR CULT: ABNORMAL

## 2024-06-18 RX ORDER — NITROFURANTOIN (MONOHYDRATE/MACROCRYSTALS) 25; 75 MG/1; MG/1
100 CAPSULE ORAL
Qty: 14 | Refills: 0 | Status: ACTIVE | COMMUNITY
Start: 2024-06-18 | End: 1900-01-01

## 2024-07-03 ENCOUNTER — APPOINTMENT (OUTPATIENT)
Dept: OBGYN | Facility: CLINIC | Age: 39
End: 2024-07-03
Payer: COMMERCIAL

## 2024-07-03 VITALS
WEIGHT: 136 LBS | OXYGEN SATURATION: 98 % | SYSTOLIC BLOOD PRESSURE: 94 MMHG | RESPIRATION RATE: 16 BRPM | BODY MASS INDEX: 25.03 KG/M2 | DIASTOLIC BLOOD PRESSURE: 60 MMHG | HEART RATE: 77 BPM | HEIGHT: 62 IN

## 2024-07-03 PROCEDURE — 99214 OFFICE O/P EST MOD 30 MIN: CPT

## 2024-07-03 PROCEDURE — 36415 COLL VENOUS BLD VENIPUNCTURE: CPT

## 2024-07-03 PROCEDURE — 0502F SUBSEQUENT PRENATAL CARE: CPT

## 2024-07-08 ENCOUNTER — APPOINTMENT (OUTPATIENT)
Dept: OBGYN | Facility: CLINIC | Age: 39
End: 2024-07-08
Payer: COMMERCIAL

## 2024-07-08 PROCEDURE — 0502F SUBSEQUENT PRENATAL CARE: CPT

## 2024-07-08 PROCEDURE — 99441: CPT

## 2024-07-17 ENCOUNTER — ASOB RESULT (OUTPATIENT)
Age: 39
End: 2024-07-17

## 2024-07-17 ENCOUNTER — APPOINTMENT (OUTPATIENT)
Dept: OBGYN | Facility: CLINIC | Age: 39
End: 2024-07-17
Payer: COMMERCIAL

## 2024-07-17 VITALS
HEIGHT: 62 IN | BODY MASS INDEX: 25.03 KG/M2 | WEIGHT: 136 LBS | RESPIRATION RATE: 16 BRPM | OXYGEN SATURATION: 99 % | SYSTOLIC BLOOD PRESSURE: 92 MMHG | HEART RATE: 79 BPM | DIASTOLIC BLOOD PRESSURE: 60 MMHG

## 2024-07-17 DIAGNOSIS — Z34.90 ENCOUNTER FOR SUPERVISION OF NORMAL PREGNANCY, UNSPECIFIED, UNSPECIFIED TRIMESTER: ICD-10-CM

## 2024-07-17 PROCEDURE — 0502F SUBSEQUENT PRENATAL CARE: CPT

## 2024-07-17 PROCEDURE — 76819 FETAL BIOPHYS PROFIL W/O NST: CPT

## 2024-07-19 LAB
BILIRUB UR QL STRIP: NEGATIVE
GLUCOSE UR-MCNC: NEGATIVE
HCG UR QL: 0.2 EU/DL
HGB UR QL STRIP.AUTO: ABNORMAL
KETONES UR-MCNC: NEGATIVE
LEUKOCYTE ESTERASE UR QL STRIP: ABNORMAL
NITRITE UR QL STRIP: NEGATIVE
PH UR STRIP: 7
PROT UR STRIP-MCNC: NEGATIVE
SP GR UR STRIP: 1.01

## 2024-07-31 ENCOUNTER — APPOINTMENT (OUTPATIENT)
Dept: OBGYN | Facility: CLINIC | Age: 39
End: 2024-07-31
Payer: COMMERCIAL

## 2024-07-31 VITALS
BODY MASS INDEX: 25.58 KG/M2 | DIASTOLIC BLOOD PRESSURE: 60 MMHG | OXYGEN SATURATION: 99 % | HEART RATE: 77 BPM | HEIGHT: 62 IN | WEIGHT: 139 LBS | RESPIRATION RATE: 16 BRPM | SYSTOLIC BLOOD PRESSURE: 90 MMHG

## 2024-07-31 PROCEDURE — 0502F SUBSEQUENT PRENATAL CARE: CPT

## 2024-07-31 RX ORDER — TETANUS TOXOID, REDUCED DIPHTHERIA TOXOID AND ACELLULAR PERTUSSIS VACCINE, ADSORBED 5; 2.5; 8; 8; 2.5 [IU]/.5ML; [IU]/.5ML; UG/.5ML; UG/.5ML; UG/.5ML
5-2.5-18.5 SUSPENSION INTRAMUSCULAR
Qty: 1 | Refills: 0 | Status: COMPLETED | COMMUNITY
Start: 2024-07-31 | End: 2024-08-01

## 2024-08-01 LAB
BILIRUB UR QL STRIP: NEGATIVE
CLARITY UR: CLEAR
COLLECTION METHOD: NORMAL
GLUCOSE UR-MCNC: NEGATIVE
HCG UR QL: 0.2 EU/DL
HGB UR QL STRIP.AUTO: NEGATIVE
KETONES UR-MCNC: NEGATIVE
LEUKOCYTE ESTERASE UR QL STRIP: NEGATIVE
NITRITE UR QL STRIP: NEGATIVE
PH UR STRIP: 8
PROT UR STRIP-MCNC: NEGATIVE
SP GR UR STRIP: 1.02

## 2024-08-07 ENCOUNTER — APPOINTMENT (OUTPATIENT)
Dept: OBGYN | Facility: CLINIC | Age: 39
End: 2024-08-07

## 2024-08-07 ENCOUNTER — ASOB RESULT (OUTPATIENT)
Age: 39
End: 2024-08-07

## 2024-08-07 PROCEDURE — 0502F SUBSEQUENT PRENATAL CARE: CPT

## 2024-08-07 PROCEDURE — 76818 FETAL BIOPHYS PROFILE W/NST: CPT

## 2024-08-15 ENCOUNTER — APPOINTMENT (OUTPATIENT)
Dept: OBGYN | Facility: CLINIC | Age: 39
End: 2024-08-15
Payer: COMMERCIAL

## 2024-08-15 VITALS
BODY MASS INDEX: 25.76 KG/M2 | RESPIRATION RATE: 16 BRPM | HEIGHT: 62 IN | WEIGHT: 140 LBS | HEART RATE: 82 BPM | OXYGEN SATURATION: 98 % | DIASTOLIC BLOOD PRESSURE: 60 MMHG | SYSTOLIC BLOOD PRESSURE: 90 MMHG

## 2024-08-15 PROCEDURE — 0502F SUBSEQUENT PRENATAL CARE: CPT

## 2024-08-15 PROCEDURE — 76819 FETAL BIOPHYS PROFIL W/O NST: CPT

## 2024-08-22 ENCOUNTER — ASOB RESULT (OUTPATIENT)
Age: 39
End: 2024-08-22

## 2024-08-22 ENCOUNTER — APPOINTMENT (OUTPATIENT)
Dept: ANTEPARTUM | Facility: CLINIC | Age: 39
End: 2024-08-22

## 2024-08-22 ENCOUNTER — APPOINTMENT (OUTPATIENT)
Dept: OBGYN | Facility: CLINIC | Age: 39
End: 2024-08-22
Payer: COMMERCIAL

## 2024-08-22 VITALS
WEIGHT: 140 LBS | HEIGHT: 62 IN | BODY MASS INDEX: 25.76 KG/M2 | HEART RATE: 76 BPM | RESPIRATION RATE: 16 BRPM | TEMPERATURE: 98.3 F | OXYGEN SATURATION: 97 % | DIASTOLIC BLOOD PRESSURE: 70 MMHG | SYSTOLIC BLOOD PRESSURE: 107 MMHG

## 2024-08-22 DIAGNOSIS — Z34.90 ENCOUNTER FOR SUPERVISION OF NORMAL PREGNANCY, UNSPECIFIED, UNSPECIFIED TRIMESTER: ICD-10-CM

## 2024-08-22 PROCEDURE — 0502F SUBSEQUENT PRENATAL CARE: CPT

## 2024-08-22 PROCEDURE — 76818 FETAL BIOPHYS PROFILE W/NST: CPT

## 2024-08-23 ENCOUNTER — INPATIENT (INPATIENT)
Facility: HOSPITAL | Age: 39
LOS: 1 days | Discharge: HOME | End: 2024-08-25
Attending: OBSTETRICS & GYNECOLOGY | Admitting: OBSTETRICS & GYNECOLOGY
Payer: COMMERCIAL

## 2024-08-23 ENCOUNTER — TRANSCRIPTION ENCOUNTER (OUTPATIENT)
Age: 39
End: 2024-08-23

## 2024-08-23 VITALS — DIASTOLIC BLOOD PRESSURE: 61 MMHG | SYSTOLIC BLOOD PRESSURE: 105 MMHG | HEART RATE: 80 BPM

## 2024-08-23 DIAGNOSIS — O26.899 OTHER SPECIFIED PREGNANCY RELATED CONDITIONS, UNSPECIFIED TRIMESTER: ICD-10-CM

## 2024-08-23 DIAGNOSIS — O42.10 PREMATURE RUPTURE OF MEMBRANES, ONSET OF LABOR MORE THAN 24 HOURS FOLLOWING RUPTURE, UNSPECIFIED WEEKS OF GESTATION: ICD-10-CM

## 2024-08-23 LAB
BASOPHILS # BLD AUTO: 0.04 K/UL — SIGNIFICANT CHANGE UP (ref 0–0.2)
BASOPHILS NFR BLD AUTO: 0.5 % — SIGNIFICANT CHANGE UP (ref 0–2)
BLD GP AB SCN SERPL QL: NEGATIVE — SIGNIFICANT CHANGE UP
EOSINOPHIL # BLD AUTO: 0.08 K/UL — SIGNIFICANT CHANGE UP (ref 0–0.5)
EOSINOPHIL NFR BLD AUTO: 0.9 % — SIGNIFICANT CHANGE UP (ref 0–6)
HCT VFR BLD CALC: 37.3 % — SIGNIFICANT CHANGE UP (ref 34.5–45)
HGB BLD-MCNC: 12.3 G/DL — SIGNIFICANT CHANGE UP (ref 11.5–15.5)
IANC: 5.69 K/UL — SIGNIFICANT CHANGE UP (ref 1.8–7.4)
IMM GRANULOCYTES NFR BLD AUTO: 1 % — HIGH (ref 0–0.9)
LYMPHOCYTES # BLD AUTO: 1.95 K/UL — SIGNIFICANT CHANGE UP (ref 1–3.3)
LYMPHOCYTES # BLD AUTO: 22.3 % — SIGNIFICANT CHANGE UP (ref 13–44)
MCHC RBC-ENTMCNC: 29.1 PG — SIGNIFICANT CHANGE UP (ref 27–34)
MCHC RBC-ENTMCNC: 33 GM/DL — SIGNIFICANT CHANGE UP (ref 32–36)
MCV RBC AUTO: 88.2 FL — SIGNIFICANT CHANGE UP (ref 80–100)
MONOCYTES # BLD AUTO: 0.9 K/UL — SIGNIFICANT CHANGE UP (ref 0–0.9)
MONOCYTES NFR BLD AUTO: 10.3 % — SIGNIFICANT CHANGE UP (ref 2–14)
NEUTROPHILS # BLD AUTO: 5.69 K/UL — SIGNIFICANT CHANGE UP (ref 1.8–7.4)
NEUTROPHILS NFR BLD AUTO: 65 % — SIGNIFICANT CHANGE UP (ref 43–77)
NRBC # BLD: 0 /100 WBCS — SIGNIFICANT CHANGE UP (ref 0–0)
NRBC # FLD: 0 K/UL — SIGNIFICANT CHANGE UP (ref 0–0)
PLATELET # BLD AUTO: 217 K/UL — SIGNIFICANT CHANGE UP (ref 150–400)
RBC # BLD: 4.23 M/UL — SIGNIFICANT CHANGE UP (ref 3.8–5.2)
RBC # FLD: 12.9 % — SIGNIFICANT CHANGE UP (ref 10.3–14.5)
RH IG SCN BLD-IMP: POSITIVE — SIGNIFICANT CHANGE UP
T PALLIDUM AB TITR SER: NEGATIVE — SIGNIFICANT CHANGE UP
WBC # BLD: 8.75 K/UL — SIGNIFICANT CHANGE UP (ref 3.8–10.5)
WBC # FLD AUTO: 8.75 K/UL — SIGNIFICANT CHANGE UP (ref 3.8–10.5)

## 2024-08-23 PROCEDURE — 76815 OB US LIMITED FETUS(S): CPT | Mod: 26

## 2024-08-23 RX ORDER — IBUPROFEN 600 MG
600 TABLET ORAL EVERY 6 HOURS
Refills: 0 | Status: DISCONTINUED | OUTPATIENT
Start: 2024-08-23 | End: 2024-08-25

## 2024-08-23 RX ORDER — HYDROCORTISONE 1 %
1 OINTMENT (GRAM) TOPICAL EVERY 6 HOURS
Refills: 0 | Status: DISCONTINUED | OUTPATIENT
Start: 2024-08-23 | End: 2024-08-25

## 2024-08-23 RX ORDER — VITAMIN A, ASCORBIC ACID, VITAMIN D, .ALPHA.-TOCOPHEROL, THIAMINE MONONITRATE, RIBOFLAVIN, NIACIN, PYRIDOXINE HYDROCHLORIDE, FOLIC ACID, CYANOCOBALAMIN, CALCIUM, IRON, MAGNESIUM, ZINC, AND COPPER 2700; 70; 400; 30; 1.6; 1.8; 18; 2.5; 1; 12; 100; 65; 25; 25; 2 [IU]/1; MG/1; [IU]/1; [IU]/1; MG/1; MG/1; MG/1; MG/1; MG/1; UG/1; MG/1; MG/1; MG/1; MG/1; MG/1
1 TABLET ORAL
Refills: 0 | DISCHARGE

## 2024-08-23 RX ORDER — VITAMIN A, ASCORBIC ACID, VITAMIN D, .ALPHA.-TOCOPHEROL, THIAMINE MONONITRATE, RIBOFLAVIN, NIACIN, PYRIDOXINE HYDROCHLORIDE, FOLIC ACID, CYANOCOBALAMIN, CALCIUM, IRON, MAGNESIUM, ZINC, AND COPPER 2700; 70; 400; 30; 1.6; 1.8; 18; 2.5; 1; 12; 100; 65; 25; 25; 2 [IU]/1; MG/1; [IU]/1; [IU]/1; MG/1; MG/1; MG/1; MG/1; MG/1; UG/1; MG/1; MG/1; MG/1; MG/1; MG/1
1 TABLET ORAL DAILY
Refills: 0 | Status: DISCONTINUED | OUTPATIENT
Start: 2024-08-23 | End: 2024-08-25

## 2024-08-23 RX ORDER — CHLORHEXIDINE GLUCONATE 40 MG/ML
1 SOLUTION TOPICAL DAILY
Refills: 0 | Status: DISCONTINUED | OUTPATIENT
Start: 2024-08-23 | End: 2024-08-23

## 2024-08-23 RX ORDER — LANOLIN
1 OINTMENT (GRAM) TOPICAL EVERY 6 HOURS
Refills: 0 | Status: DISCONTINUED | OUTPATIENT
Start: 2024-08-23 | End: 2024-08-25

## 2024-08-23 RX ORDER — VITAMIN A, ASCORBIC ACID, VITAMIN D, .ALPHA.-TOCOPHEROL, THIAMINE MONONITRATE, RIBOFLAVIN, NIACIN, PYRIDOXINE HYDROCHLORIDE, FOLIC ACID, CYANOCOBALAMIN, CALCIUM, IRON, MAGNESIUM, ZINC, AND COPPER 2700; 70; 400; 30; 1.6; 1.8; 18; 2.5; 1; 12; 100; 65; 25; 25; 2 [IU]/1; MG/1; [IU]/1; [IU]/1; MG/1; MG/1; MG/1; MG/1; MG/1; UG/1; MG/1; MG/1; MG/1; MG/1; MG/1
1 TABLET ORAL
Qty: 0 | Refills: 0 | DISCHARGE
Start: 2024-08-23

## 2024-08-23 RX ORDER — OXYTOCIN 10 UNIT/ML
41.67 AMPUL (ML) INJECTION
Qty: 20 | Refills: 0 | Status: COMPLETED | OUTPATIENT
Start: 2024-08-23 | End: 2024-08-23

## 2024-08-23 RX ORDER — PRAMOXINE HCL 1 %
1 GEL (GRAM) TOPICAL EVERY 4 HOURS
Refills: 0 | Status: DISCONTINUED | OUTPATIENT
Start: 2024-08-23 | End: 2024-08-25

## 2024-08-23 RX ORDER — WITCH HAZEL 1 G/ML
1 LIQUID TOPICAL EVERY 4 HOURS
Refills: 0 | Status: DISCONTINUED | OUTPATIENT
Start: 2024-08-23 | End: 2024-08-25

## 2024-08-23 RX ORDER — KETOROLAC TROMETHAMINE 30 MG/ML
30 INJECTION, SOLUTION INTRAMUSCULAR ONCE
Refills: 0 | Status: DISCONTINUED | OUTPATIENT
Start: 2024-08-23 | End: 2024-08-23

## 2024-08-23 RX ORDER — ONDANSETRON 2 MG/ML
4 INJECTION, SOLUTION INTRAMUSCULAR; INTRAVENOUS ONCE
Refills: 0 | Status: DISCONTINUED | OUTPATIENT
Start: 2024-08-23 | End: 2024-08-25

## 2024-08-23 RX ORDER — IBUPROFEN 600 MG
600 TABLET ORAL EVERY 6 HOURS
Refills: 0 | Status: COMPLETED | OUTPATIENT
Start: 2024-08-23 | End: 2025-07-22

## 2024-08-23 RX ORDER — OXYTOCIN 10 UNIT/ML
AMPUL (ML) INJECTION
Qty: 30 | Refills: 0 | Status: DISCONTINUED | OUTPATIENT
Start: 2024-08-23 | End: 2024-08-23

## 2024-08-23 RX ORDER — IBUPROFEN 600 MG
1 TABLET ORAL
Qty: 0 | Refills: 0 | DISCHARGE
Start: 2024-08-23

## 2024-08-23 RX ORDER — SODIUM CHLORIDE 9 MG/ML
3 INJECTION INTRAMUSCULAR; INTRAVENOUS; SUBCUTANEOUS EVERY 8 HOURS
Refills: 0 | Status: DISCONTINUED | OUTPATIENT
Start: 2024-08-23 | End: 2024-08-25

## 2024-08-23 RX ORDER — ACETAMINOPHEN 325 MG/1
3 TABLET ORAL
Qty: 0 | Refills: 0 | DISCHARGE
Start: 2024-08-23

## 2024-08-23 RX ORDER — OXYCODONE HYDROCHLORIDE 5 MG/1
5 TABLET ORAL ONCE
Refills: 0 | Status: DISCONTINUED | OUTPATIENT
Start: 2024-08-23 | End: 2024-08-25

## 2024-08-23 RX ORDER — TETANUS TOXOID, REDUCED DIPHTHERIA TOXOID AND ACELLULAR PERTUSSIS VACCINE, ADSORBED 5; 2.5; 8; 8; 2.5 [IU]/.5ML; [IU]/.5ML; UG/.5ML; UG/.5ML; UG/.5ML
0.5 SUSPENSION INTRAMUSCULAR ONCE
Refills: 0 | Status: DISCONTINUED | OUTPATIENT
Start: 2024-08-23 | End: 2024-08-25

## 2024-08-23 RX ORDER — ACETAMINOPHEN 325 MG/1
1000 TABLET ORAL ONCE
Refills: 0 | Status: COMPLETED | OUTPATIENT
Start: 2024-08-23 | End: 2024-08-23

## 2024-08-23 RX ORDER — OXYCODONE HYDROCHLORIDE 5 MG/1
5 TABLET ORAL
Refills: 0 | Status: DISCONTINUED | OUTPATIENT
Start: 2024-08-23 | End: 2024-08-25

## 2024-08-23 RX ORDER — MENTHOL/CETYLPYRD CL 3 MG
1 LOZENGE MUCOUS MEMBRANE EVERY 6 HOURS
Refills: 0 | Status: DISCONTINUED | OUTPATIENT
Start: 2024-08-23 | End: 2024-08-25

## 2024-08-23 RX ORDER — OXYTOCIN 10 UNIT/ML
333.33 AMPUL (ML) INJECTION
Qty: 20 | Refills: 0 | Status: DISCONTINUED | OUTPATIENT
Start: 2024-08-23 | End: 2024-08-23

## 2024-08-23 RX ORDER — DIPHENHYDRAMINE HCL 50 MG
25 CAPSULE ORAL EVERY 6 HOURS
Refills: 0 | Status: DISCONTINUED | OUTPATIENT
Start: 2024-08-23 | End: 2024-08-25

## 2024-08-23 RX ORDER — ACETAMINOPHEN 325 MG/1
975 TABLET ORAL
Refills: 0 | Status: DISCONTINUED | OUTPATIENT
Start: 2024-08-23 | End: 2024-08-25

## 2024-08-23 RX ADMIN — Medication 125 MILLILITER(S): at 07:37

## 2024-08-23 RX ADMIN — ACETAMINOPHEN 975 MILLIGRAM(S): 325 TABLET ORAL at 18:26

## 2024-08-23 RX ADMIN — OXYCODONE HYDROCHLORIDE 5 MILLIGRAM(S): 5 TABLET ORAL at 19:00

## 2024-08-23 RX ADMIN — Medication 1000 MILLILITER(S): at 14:30

## 2024-08-23 RX ADMIN — OXYCODONE HYDROCHLORIDE 5 MILLIGRAM(S): 5 TABLET ORAL at 18:26

## 2024-08-23 RX ADMIN — Medication 2 MILLIUNIT(S)/MIN: at 08:27

## 2024-08-23 RX ADMIN — SODIUM CHLORIDE 3 MILLILITER(S): 9 INJECTION INTRAMUSCULAR; INTRAVENOUS; SUBCUTANEOUS at 21:19

## 2024-08-23 RX ADMIN — Medication 600 MILLIGRAM(S): at 21:30

## 2024-08-23 RX ADMIN — Medication 125 MILLIUNIT(S)/MIN: at 18:01

## 2024-08-23 RX ADMIN — KETOROLAC TROMETHAMINE 30 MILLIGRAM(S): 30 INJECTION, SOLUTION INTRAMUSCULAR at 16:00

## 2024-08-23 RX ADMIN — ACETAMINOPHEN 975 MILLIGRAM(S): 325 TABLET ORAL at 19:00

## 2024-08-23 RX ADMIN — ACETAMINOPHEN 1000 MILLIGRAM(S): 325 TABLET ORAL at 07:30

## 2024-08-23 RX ADMIN — Medication 2 MILLIUNIT(S)/MIN: at 06:02

## 2024-08-23 RX ADMIN — ACETAMINOPHEN 400 MILLIGRAM(S): 325 TABLET ORAL at 04:46

## 2024-08-23 RX ADMIN — Medication 600 MILLIGRAM(S): at 21:00

## 2024-08-23 RX ADMIN — Medication 125 MILLILITER(S): at 04:46

## 2024-08-23 NOTE — OB PROVIDER LABOR PROGRESS NOTE - ASSESSMENT
A/P    Patient is in stable condition. Pitocin had been started at 2u approximately two minutes prior to decel. Patient repositioned with return to baseline.    - Pause pitocin  - Cont EFM, [place]  - Cont IV    Evaluated with Dr. Mj Gaviria, PGY1

## 2024-08-23 NOTE — DISCHARGE NOTE OB - NS MD DC FALL RISK RISK
For information on Fall & Injury Prevention, visit: [email or web address] OR  [email or web address] OR  https://www.cdc.gov/steadi/patient.html

## 2024-08-23 NOTE — DISCHARGE NOTE OB - CARE PROVIDER_API CALL
no
Sarkis Hoover  Obstetrics and Gynecology  [address], [address]  [place] [address]  Phone: [phone]  Fax: [phone]  Follow Up Time:

## 2024-08-23 NOTE — OB PROVIDER LABOR PROGRESS NOTE - NS_OBIHIFHRDETAILS_OBGYN_ALL_OB_FT
FETAL HEART RATE   Baseline: 140 bpm, moderate variability,  + accels, variable and few late decels
baseline 135/mod variability/+accels, -decels
baseline prior to decel 135/mod variability/+accels, -decels. Prolonged decel with loss of FH contact, yokasta 60bpm, return to new baseline of 155

## 2024-08-23 NOTE — OB PROVIDER TRIAGE NOTE - NSHPPHYSICALEXAM_GEN_ALL_CORE
ICU Vital Signs Last 24 Hrs  T(C): 37.2 (23 Aug 2024 01:54), Max: 37.2 (23 Aug 2024 01:54)  T(F): 99 (23 Aug 2024 01:54), Max: 99 (23 Aug 2024 01:54)  HR: 80 (23 Aug 2024 01:54) (80 - 80)  BP: 105/61 (23 Aug 2024 01:54) (105/61 - 105/61)  RR: 16 (23 Aug 2024 01:54) (16 - 16)    General: Patient sitting uncomfortably in bed, A&Ox3  Abd: soft, non-tender, gravid, TOCO in place  Bedside Transabdominal Sonogram: Vertex presentation, anterior placenta, +FH noted, M mode 131 BPM, MVP 1.87cm, images saved in ASOB  EFM: 145 BPM baseline, moderate variability, +accels, - decels, category I tracing, reactive  [place]: Regular contractions q3 min  SSE: Grossly ruptured on exam, +pooling clear fluid in posterior fornix, no bleeding, +Nitrazine, +ferning  SVE: 4/50/-3  Ext:  FROM bilateral no edema  Neuro: grossly intact

## 2024-08-23 NOTE — DISCHARGE NOTE OB - PATIENT PORTAL LINK FT
You can access the FollowMyHealth Patient Portal offered by [facility] by registering at the following website: [email or web address]. By joining [employer]’s FollowMyHealth portal, you will also be able to view your health information using other applications (apps) compatible with our system.

## 2024-08-23 NOTE — OB PROVIDER LABOR PROGRESS NOTE - ASSESSMENT
Pt is a 40yo  admitted for labor at term.    - IUPC placed, amnioinfusion started with improvement of tracing   - BP noted to be in 89/51, 500cc bolus given, pressure improved to 110s/60s  - pit was at 6, now at 3  - Continue cont EFM, toco, IVF    D/w Dr. Nerenberg A Sacks, MD PGY1

## 2024-08-23 NOTE — CHART NOTE - NSCHARTNOTEFT_GEN_A_CORE
Pt seen and examined at bedside after RN alerted that pt's vulva is very swollen.     Pt reports feeling overall well but tired. Denies light headedness, dizziness or perineal pain. Reports feeling swollen. VSS    Vital Signs Last 24 Hrs  T(C): 37.0 (23 Aug 2024 09:00), Max: 37.2 (23 Aug 2024 01:54)  T(F): 98.6 (23 Aug 2024 09:00), Max: 99 (23 Aug 2024 01:54)  HR: 84 (23 Aug 2024 14:04) (62 - 111)  BP: 102/58 (23 Aug 2024 13:52) (73/44 - 132/74)  BP(mean): --  RR: 16 (23 Aug 2024 09:00) (15 - 18)  SpO2: 99% (23 Aug 2024 13:59) (81% - 100%)    Parameters below as of 23 Aug 2024 03:09  Patient On (Oxygen Delivery Method): room air    Gen: A&Ox4, NAD  Abdomen: soft, nontender, not distended   Fundus: firm, midline, 2FB below U  Perineum: Lochia light, repair well approximated, edema noted, no ecchymosis, no palpable hematoma on vaginal & rectal exam    40y/o  PPD0 post  with perineal swelling w/o s/s of hematoma    plan  -ice packs to perineum  -routine PP care    Discussed w/ Dr Quynh DOMINGUEZ

## 2024-08-23 NOTE — OB PROVIDER H&P - PROBLEM SELECTOR PLAN 1
P: Admit to L&D for labor      Expectant managment      Clear liquid diet      EFM/TOCO      IV access      Admit labs & consents signed      4 epidural

## 2024-08-23 NOTE — OB PROVIDER H&P - ASSESSMENT
A: 40 y/o  @ 38w3d GA in early labor with SROM. GBS negative.    P: Admit to L&D for labor      Expectant managment      Clear liquid diet      EFM/TOCO      IV access      Admit labs & consents signed      4 epidural    Risks, benefits, alternatives, and possible complications have been discussed in detail by Dr Barker with the patient in her native language, Pre-admission, admission, post admission procedures and expectations were discussed in detail. All questions answered, all appropriate hospital consents were signed. Anticipate normal vaginal delivery.  Informed consent was obtained. The following was discussed:  - Induction/augmentation of labor: use of medication or cook balloon to begin or enhance labor  - Obstetrical management including maternal-fetal contraction monitoring    Case discussed with Dr Mj Nicole PA-C

## 2024-08-23 NOTE — DISCHARGE NOTE OB - CARE PLAN
Principal Discharge DX:	 (normal spontaneous vaginal delivery)  Assessment and plan of treatment:	return 6 weeks   1

## 2024-08-23 NOTE — OB PROVIDER LABOR PROGRESS NOTE - NS_SUBJECTIVE/OBJECTIVE_OBGYN_ALL_OB_FT
S:  Pt seen and examined for variable decels.     O:  Vital Signs Last 24 Hrs  T(C): 37.0 (23 Aug 2024 09:00), Max: 37.2 (23 Aug 2024 01:54)  T(F): 98.6 (23 Aug 2024 09:00), Max: 99 (23 Aug 2024 01:54)  HR: 81 (23 Aug 2024 10:54) (62 - 111)  BP: 111/69 (23 Aug 2024 10:53) (73/44 - 132/74)  BP(mean): --  RR: 16 (23 Aug 2024 09:00) (15 - 18)  SpO2: 98% (23 Aug 2024 10:54) (90% - 100%)    Parameters below as of 23 Aug 2024 03:09  Patient On (Oxygen Delivery Method): room air

## 2024-08-23 NOTE — OB PROVIDER TRIAGE NOTE - NSOBPROVIDERNOTE_OBGYN_ALL_OB_FT
A: 38 y/o  @ 38w3d GA in early labor with SROM. GBS negative.    P: Admit to L&D for labor      Expectant management      Clear liquid diet      EFM/TOCO      IV access      Admit labs & consents signed      4 epidural    Risks, benefits, alternatives, and possible complications have been discussed in detail by Dr Barker with the patient in her native language, Pre-admission, admission, post admission procedures and expectations were discussed in detail. All questions answered, all appropriate hospital consents were signed. Anticipate normal vaginal delivery.  Informed consent was obtained. The following was discussed:  - Induction/augmentation of labor: use of medication or cook balloon to begin or enhance labor  - Obstetrical management including maternal-fetal contraction monitoring    Case discussed with Dr Mj Nicole PA-C

## 2024-08-23 NOTE — OB PROVIDER H&P - NSHPPHYSICALEXAM_GEN_ALL_CORE
ICU Vital Signs Last 24 Hrs  T(C): 37.2 (23 Aug 2024 01:54), Max: 37.2 (23 Aug 2024 01:54)  T(F): 99 (23 Aug 2024 01:54), Max: 99 (23 Aug 2024 01:54)  HR: 80 (23 Aug 2024 01:54) (80 - 80)  BP: 105/61 (23 Aug 2024 01:54) (105/61 - 105/61)  RR: 16 (23 Aug 2024 01:54) (16 - 16)    General: Patient sitting uncomfortably in bed, A&Ox3  Abd: soft, non-tender, gravid, TOCO in place  Bedside Transabdominal Sonogram: Vertex presentation, anterior placenta, +FH noted, M mode 131 BPM, MVP 1.87cm, images saved in ASOB  EFM: 145 BPM baseline, moderate variability, +accels, - decels, category I tracing, reactive  [place]: Regular contractions q3 min  SSE: Grossly ruptured on exam, +pooling clear fluid in posterior fornix, no bleeding, +Nitrazine, +ferning  SVE: 4/50/-3  Ext:  FROM bilateral no edema  Neuro: grossly intact

## 2024-08-23 NOTE — DISCHARGE NOTE OB - MEDICATION SUMMARY - MEDICATIONS TO TAKE
Received refill request for nortriptyline from  [facility]. Rx pended to Dr. Rodriguez for review and signature.    Tabitha Arteaga, RN Care Coordinator    
I will START or STAY ON the medications listed below when I get home from the hospital:    ibuprofen 600 mg oral tablet  -- 1 tab(s) by mouth every 6 hours  -- Indication: For Pain    acetaminophen 325 mg oral tablet  -- 3 tab(s) by mouth 4 times a day as needed for pain  -- Indication: For Pain    Prenatal Multivitamins with Folic Acid 1 mg oral tablet  -- 1 tab(s) by mouth once a day  -- Indication: For vitamins

## 2024-08-23 NOTE — PROVIDER CONTACT NOTE (OTHER) - SITUATION
patient PP from 11:57 AM feels the urge to void , emptied 150 ML in bathroom at this time but cannot empty bladder fully ,   patient perineum swollen , got examined and assessed by JESSICA Bird CNM

## 2024-08-23 NOTE — OB RN PATIENT PROFILE - FALL HARM RISK - UNIVERSAL INTERVENTIONS
Bed in lowest position, wheels locked, appropriate side rails in place/Call bell, personal items and telephone in reach/Instruct patient to call for assistance before getting out of bed or chair/Non-slip footwear when patient is out of bed/[place] to call system/Physically safe environment - no spills, clutter or unnecessary equipment/Purposeful Proactive Rounding/Room/bathroom lighting operational, light cord in reach

## 2024-08-23 NOTE — OB PROVIDER TRIAGE NOTE - NS_OBGYNHISTORY_OBGYN_ALL_OB_FT
OB History:   - FT  @ 38w GA, 18, 2807g female - uncomplicated  - FT  @ 39w GA, 22, 3120g female - uncomplicated  - SAB x1, no D&C    GYN History: Denies history of endometriosis/uterine fibroids/cysts/abnormal pap smears/STIs

## 2024-08-23 NOTE — OB RN DELIVERY SUMMARY - NS_SEPSISRSKCALC_OBGYN_ALL_OB_FT
EOS calculated successfully. EOS Risk Factor: 0.5/1000 live births ([facility] national incidence); GA=38w3d; Temp=99; ROM=11.45; GBS='Negative'; Antibiotics='No antibiotics or any antibiotics < 2 hrs prior to birth'

## 2024-08-23 NOTE — OB RN PATIENT PROFILE - NSSDOHCURSE_OBGYN_A_OB
12/02/19 1700   Ankle Exercises   Double Leg Calf Raises Reps/Sets/Weight 30   Standing Dorsiflexion Stretch(Gastroc) Reps/Sets/Hold Time 3x30   Knee Exercises   Frontal Squats Reps/Sets/Weight 30   Tg/Shuttle/Reformer Squats Reps/Sets/Weight/Level 6' 50#   Additional Exercises   Additional Exercise nustep 12' l2   Additional Exercise mtt 6'.      never

## 2024-08-23 NOTE — OB PROVIDER H&P - NSLOWPPHRISK_OBGYN_A_OB
No previous uterine incision/[place] Pregnancy/Less than or equal to 4 previous vaginal births/No known bleeding disorder/No history of postpartum hemorrhage

## 2024-08-23 NOTE — OB PROVIDER TRIAGE NOTE - HISTORY OF PRESENT ILLNESS
38 y/o  at 38w3d GA, JEN 24, presenting with c/o painful ctx and leakage of fluid since 0030 this evening.  Pt reports hearing "pop" and felt a gush of fluid at 0030, leakage has been continuous since onset with clear fluid.  Pt states ctx are q3-5 mins, rates pain to be 5 out of 10 in severity.  Endorses good fetal movement. Denies: vaginal bleeding, abdominal pain between ctx, nausea/vomiting.  Patient desires epidural for pain control.    Primary OB care with Dr Kiko MEADOWS pregnancy  Increased risk for trisomy 21 on first trimester screen, NIPS low risk  GBS: negative (24)  MFM sono from 24: Cephalic presentation, posterior placenta, 2144g (43%), AC 74%, STANLEY wnl  EFW extrapolated from last MFM sono: 3100g

## 2024-08-23 NOTE — OB RN TRIAGE NOTE - IN THE PAST 12 MONTHS HAVE YOU USED DRUGS OTHER THAN THOSE REQUIRED FOR MEDICAL REASON?
FINDINGS FROM YOUR EXAM:  Colon polyp removed  Diverticulosis  Prior tattoo site identified  Benign blood vessel, AVM  Random colon biopsies obtained    INSTRUCTIONS:  During your exam biopsies were obtained. You will be called with the results within 7 to 10 days. If you have not heard from Dr. Alvaro Boyd by then, please call his [place] office at [phone].  Biopsies unremarkable, consider trial of low FODMAPs diet.  See below  Resume Plavix tomorrow    DIET:  Resume your regular diet today.    ACTIVITY:  Following sedation, your judgment, perception and coordination are impaired for a minimum of 24 hours. Therefore:  A RESPONSIBLE ADULT MUST ACCOMPANY YOU AND DRIVE YOU HOME.  Do not drive.   Do not return to work today.   Do not operate appliances or machinery.  Do not sign legal documents or be involved in work decisions.   Do not smoke or drink alcoholic beverages for 24 hours.  Plan to spend a few hours resting before resuming your normal routine.   If you are on narcotics or sedative medications, hold your dose today to avoid over-sedation.    SYMPTOMS TO WATCH FOR AFTER THE PROCEDURE:  Please call Dr. Alvaro Body at [phone] or proceed to the nearest hospital in the event that you experience any of the following:     After an upper endoscopy:  Difficulty swallowing or breathing  Neck swelling  Pain  Nausea or vomiting  Abdominal distention  Fever  Severe throat pain. Mild throat soreness may follow this procedure. Warm salt-water gargle or lozenges may relieve your mild discomfort.   Red or black stools     After a colonoscopy or sigmoidoscopy:  Severe abdominal distention or pain. Some mild distention and/or cramping are normal after these procedures but should pass within 1-2 hours with the passage of air.   Rectal bleeding   Nausea or vomiting  Fever      CONTACT INFORMATION:  Dr. Alvaro Boyd's office - [phone]  If you need to schedule a radiology imaging test call - [phone]    Understanding Colon and Rectal Polyps     The colon (also called the large intestine) is a muscular tube that forms the last part of the digestive tract. It absorbs water and stores food waste. The colon is about 4 to 6 feet long. The rectum is the last 6 inches of the colon. The colon and rectum have a smooth lining composed of millions of cells. Changes in these cells can lead to growths in the colon that can become cancerous and should be removed.       The colon has a smooth lining composed of millions of cells.        Changes that occur in the cells that line the colon or rectum can lead to growths called polyps. Over a period of years, polyps can turn cancerous. Removing polyps early may prevent cancer from ever forming.      Polyps      Polyps are fleshy clumps of tissue that form on the lining of the colon or rectum. Small polyps are usually benign (not cancerous). However, over time, cells in a polyp can change and become cancerous. The larger a polyp grows, the more likely this is to happen. Also, certain types of polyps known as adenomatous polyps are considered premalignant. This means that they could become cancerous if they’re not removed.    Cancer   Almost all colorectal cancers start when polyp cells begin growing abnormally. As a cancerous tumor grows, it may involve more and more of the colon or rectum. In time, cancer can also grow beyond the colon or rectum and spread to nearby organs or to glands called lymph nodes. The cells can also travel to other parts of the body. This is known as metastasis. The earlier a cancerous tumor is removed, the better the chance of preventing its spread.            © 6354-6988 [employer] [facility], [address] [place] [address], [place] [address]. All rights reserved. This information is not intended as a substitute for professional medical care. Always follow your healthcare professional's instructions.          DIVERTICULOSIS    What is diverticulosis?  It is  the name for small little pouches that can form in your colon. It forms due to a slight weakening in your colon wall where the pouch will form.     How common in diverticulosis?  Over half of the patient's over 60 years old will have diverticulosis. It becomes more common the older people get. Most people do not have any symptoms from the diverticulosis.     What causes diverticulosis?  It is thought that diverticulosis forms due to low fiber diet. Since people that have a lower fiber diet are more prone to constipation. This increases the pressure in certain parts of the colon where the pouches will form.     What is the treatment for diverticulosis?  You can not get rid of the pouches that you already have. We will usually recommend a high fiber diet to try and prevent the formation of new pouches.     Are there complications from diverticulosis?  Most people will not have any problems with their diverticulosis. Some times they can get infected and cause an inflammatory process called diverticulitis. The symptoms of diverticulitis includes abdominal pain, fever and chills. The diverticula can also be a increase risk for bleeding.     Do I have to avoid nuts and seeds?  No. In the past it was thought that the nuts and seeds may cause a blockage in the pockets. But there is no scientific evidence that this occurs.     HIGH FIBER DIET     Fiber is present in all fruits, vegetables, cereals and grains. Fiber passes through the body undigested. A high fiber diet helps food move through the intestinal tract. The added bulk is helpful in preventing constipation. In persons with diverticulosis it serves to clean out the pouches along the colon wall while preventing new ones from forming. A high fiber diet also may reduces the risk of colon cancer, decreases blood cholesterol and prevents high blood sugar in diabetics.         The foods listed below are high in fiber and should be included in your diet. If you are not used  to high fiber foods, start with 1 or 2 foods from this list. Every 3-4 days add a new one to your diet until you are eating 4 high fiber foods per day. This should give you 20-35 Gm of fiber/day. It is also important to drink a lot of water when you are on this diet (6-8 glasses a day). Water causes the fiber to swell and increases the benefit.    FOODS HIGH IN DIETARY FIBER:  BREADS: Made with 100% whole wheat flour; [place], wheat or rye crackers; tortillas, bran muffins  CEREALS: Whole grain cereal with bran (Chex, Raisin Bran, Corn Bran), oatmeal, rolled oats, granola, wheat flakes, brown rice  NUTS: Any nuts  FRUITS: All fresh fruits along with edible skins, (bananas, citrus fruit, mangoes, pears, prunes, raisins, apples, pineapple, apricot, melon, jams and marmalades), fruit juices (especially prune juice)  VEGETABLES: All types, preferably raw or lightly cooked: especially, celery, eggplant, potatoes,spinach, broccoli, brussel sprouts, winter squash, carrots, cauliflower, soybeans, lentils, fresh and dried beans of all kinds  OTHER: Popcorn, any spices    © 8390-3161 [facility], [address] [place] [address]. All rights reserved. This information is not intended as a substitute for professional medical care. Always follow your healthcare professional's instructions.      Information on a FODMAP Diet    What does FODMAP stand for?  It is an acronym for Fermentable Oligosaccharides, Disaccharides, Monosaccharides And Polyols    What are FODMAPs?  These are poorly absorbed carbohydrates. Bacteria in the intestines will ferment these types of carbohydrates. This can increase the production of gas and other products that will increase fluid into the intestines.     What symptoms could a high FODMAPs diet cause?  Due to the possible increase in gas and fluid in the intestine, this could cause symptoms of abdominal bloating and diarrhea/loose stools.     How long do I need to be on the diet to see  results?  Most studies recommend a low FODMAP diet for 6 to 8 weeks.   After 6 to 8 weeks, the diet can be less restricted depending on your symptoms.       Low FODMAP diet- Foods Ok to eat    fruitfruit vegetables herbs grain foods milk products other   banana, blueberry, boysenberry,  canteloupe, cranberry,  durian, grape,  grapefruit, honeydew  melon, kiwifruit, lemon, lime, mandarin, orange,  passionfruit, pawpaw,  raspberry, rhubarb,  rockmelon, star anise, strawberry, tangelo  Note: if fruit is dried, eat in  small quantities   alfalfa, artichoke,  bamboo shoots, bean shoots, bok nikki, carrot, celery, choko, nikki sum, endive, jaclyn, green beans,  lettuce, olives, parsnip, potato, pumpkin, red  capsicum (bell pepper),  silver beet, spinach, summer squash (yellow), swede, sweet potato, taro, tomato, turnip, yam, zucchini   basil, chili, coriander,  jaclyn, lemongrass,  marjoram, mint,  oregano, parsley,  rosemary, thyme   Cereals  gluten-free bread or  cereal products    bread  100% spelt bread    Rice    Oats    Polenta    Other  arrowroot, millet,  psyllium, quinoa,  sorgum, tapioca   milk  lactose-free milk,  oat milk*, rice milk,  soy milk*    *check for additives    cheeses  hard cheeses, and brie  and camembert  yogurt  lactose-free varieties  ice-cream  substitutes  gelati, sorbet  butter substitutes  olive oil   sweeteners  sugar* (sucrose),  glucose, artificial  sweeteners not  ending in ‘-ol’  honey substitutes  mosquera syrup*,  maple syrup*,  molasses, treacle    *small quantity                         **AVOID** these foods containing high amount of FODMAPs    Excess fructose lactose fructans galactans polyols   fruit  apple, elmer, nashi, pear, tinned fruit in natural juice,  watermelon  sweeteners  fructose, high fructose  corn syrup  large total  fructose dose  concentrated fruit  sources, large serves  of fruit, dried fruit,  fruit juice  honey  corn syrup, fruisana milk  milk from cows, goats  or  sheep, custard,  ice cream, yogurt  cheeses  soft unripened cheeses  eg. cottage, cream,  mascarpone, ricotta vegetables  asparagus, beetroot, broccoli, brussels sprouts, cabbage,eggplant, fennel, garlic,  hilario, okra, onion (all), shallots, spring onion  cereals  wheat and rye, in large amounts eg. Bread, crackers, cookies, couscous, pasta  fruit  custard apple, persimmon,  watermelon  miscellaneous  chicory, dandelion, inulin legumes  baked beans,  chickpeas,  kidney beans,  lentils fruit  apple, apricot, avocado, blackberry, cherry, lychee, nashi, nectarine, peach, pear, plum, prune, watermelon  vegetables  cauliflower, green  capsicum (bell pepper), mushroom, sweet corn  sweeteners  sorbitol (420)  mannitol (421)  isomalt (953)  maltitol (965)  xylitol (967)        No

## 2024-08-23 NOTE — OB PROVIDER DELIVERY SUMMARY - NSSELHIDDEN_OBGYN_ALL_OB_FT
[NS_DeliveryAttending1_OBGYN_ALL_OB_FT:[id number]],[NS_DeliveryAssist1_OBGYN_ALL_OB_FT:[id number]=]

## 2024-08-23 NOTE — OB PROVIDER LABOR PROGRESS NOTE - NS_SUBJECTIVE/OBJECTIVE_OBGYN_ALL_OB_FT
**INCOMPLETE NOTE**    R1 Labor & Delivery Progress Note     Pt seen & examined at bedside for     T(C): 36.7 (08-23-24 @ 04:32), Max: 37.2 (08-23-24 @ 01:54)  HR: 102 (08-23-24 @ 05:37) (62 - 111)  BP: 92/58 (08-23-24 @ 05:21) (73/44 - 132/74)  RR: 16 (08-23-24 @ 04:32) (16 - 18)  SpO2: 99% (08-23-24 @ 05:37) (90% - 100%) R1 Labor & Delivery Progress Note     Pt seen & examined at bedside for     T(C): 36.7 (08-23-24 @ 04:32), Max: 37.2 (08-23-24 @ 01:54)  HR: 102 (08-23-24 @ 05:37) (62 - 111)  BP: 92/58 (08-23-24 @ 05:21) (73/44 - 132/74)  RR: 16 (08-23-24 @ 04:32) (16 - 18)  SpO2: 99% (08-23-24 @ 05:37) (90% - 100%)

## 2024-08-23 NOTE — OB PROVIDER DELIVERY SUMMARY - NSPROVIDERDELIVERYNOTE_OBGYN_ALL_OB_FT
Patient was fully dilated and pushing. Fetal head was BELGICA and head delivered without complication. CANx1 reduced. The anterior and posterior shoulders delivered, followed by the remaining body atraumatically.  emerged vigorous and crying. Delayed cord clamping was performed, and then clamped and cut. Cord blood gases collected x2. The  was placed skin to skin with mother. The placenta delivered intact with membranes. Pitocin was administered. Uterus massaged, fundus found to be firm. Cervix, vagina and perineum inspected revealing 2nd dgree laceration, repaired using 2-0 chromic in the usual fashion with good hemostasis.     Viable female infant delivered, with APGARs 9/9    Laceration: 2nd      Dr. Beauchamp present for the delivery  Gaby Enamorado CNM

## 2024-08-23 NOTE — OB NEONATOLOGY/PEDIATRICIAN DELIVERY SUMMARY - NSPEDSNEONOTESA_OBGYN_ALL_OB_FT
Peds called to LDR for cat II tracing. 38+3 wk male born via  to a 38 y/o  mother. Maternal history of PNV.  No significant maternal or prenatal history. Maternal labs include Blood Type  A+, HIV - , RPR NR , Rubella I , Hep B - , GBS -. SROM at 0030 with clear fluids (ROM hours:12h ). Baby emerged vigorous, crying, was warmed, dried suctioned and stimulated with APGARS of 8/9 . Mom plans to initiate breastfeeding, consents Hep B vaccine and consents circ.  Highest maternal temp: 37C. EOS 0.16.    : 24  TOB: 12:43PM  BW: 3160g    Physical Exam (Post-Delivery)  Gen: NAD; well-appearing  HEENT: NC/AT; anterior fontanelle open and flat; ears and nose clinically patent, normally set; no tags, no cleft palate appreciated  Skin: pink, warm, well-perfused, no rash  Resp: non-labored breathing  Abd: soft, NT/ND; no masses appreciated, umbilical cord with 3 vessels  Extremities: moving all extremities, no crepitus; hips negative O/B  MSK: no clavicular fracture appreciated  : Eloy I; no abnormalities; anus patent  Back: no sacral dimple  Neuro: +crystal, +babinski, grasp, good tone throughout

## 2024-08-23 NOTE — OB PROVIDER LABOR PROGRESS NOTE - NS_SUBJECTIVE/OBJECTIVE_OBGYN_ALL_OB_FT
R1 Labor & Delivery Progress Note     Pt seen & examined at bedside for prolonged decel.    T(C): 36.7 (08-23-24 @ 04:32), Max: 37.2 (08-23-24 @ 01:54)  HR: 81 (08-23-24 @ 06:02) (62 - 111)  BP: 96/55 (08-23-24 @ 05:51) (73/44 - 132/74)  RR: 16 (08-23-24 @ 04:32) (16 - 18)  SpO2: 99% (08-23-24 @ 06:02) (90% - 100%)

## 2024-08-23 NOTE — OB PROVIDER LABOR PROGRESS NOTE - ASSESSMENT
A/P    Patient is in stable condition, now comfortable with an epidural.    - Continue expectant management  - Cont EFM, [place]  - Cont IV    D/w Dr. Lamar Gaviria, PGY1 A/P    Patient is in stable condition, now comfortable with an epidural.    - Start pitocin 2x2  - Cont EFM, [place]  - Cont IV    D/w Dr. Mj Gaviria, PGY1

## 2024-08-23 NOTE — OB PROVIDER TRIAGE NOTE - GRAVIDA, OB PROFILE
Back Pain    Back pain is very common in adults. The cause of back pain is rarely dangerous and the pain often gets better over time. The cause of your back pain may not be known and may include strain of muscles or ligaments, degeneration of the spinal disks, or arthritis. Occasionally the pain may radiate down your leg(s). Over-the-counter medicines to reduce pain and inflammation are often the most helpful. Stretching and remaining active frequently helps the healing process.     Rest and stay well hydrated.    Follow up with your OBGYN as scheduled.    There are spine specialists within the [facility] system you may call [phone] for your back pain, call and arrange your follow up appointment.     Take over the counter acetaminophen (Tylenol) 650-1000 mg every 4-6 hours as needed for pain. Do not take more than 3000 mg in a 24 hour period. Be aware many over the counter and prescription medications also contain acetaminophen (Tylenol).     Apply heat and perform gentle stretches as discussed.     Apply over the counter lidocaine patch to affected area as needed for pain; leave on for 12 hours, leave off for up to 12 hours.     SEEK IMMEDIATE MEDICAL CARE IF YOU HAVE ANY OF THE FOLLOWING SYMPTOMS: bowel or bladder control problems, unusual weakness or numbness in your arms or legs, nausea or vomiting, abdominal pain, fever, dizziness/lightheadedness. 4

## 2024-08-23 NOTE — CHART NOTE - NSCHARTNOTEFT_GEN_A_CORE
Pt evaluated at bedside due to dysuria.    Pt reports that she has suprapubic pressure with 5/10 urge to void despite voiding 150cc 30 mins ago. Pt reports she is unable to void further and is uncomfortable.  Bedside sono shows distended bladder.  Straight cath with 1000cc.  Gonzalez to stay in place.    Plan pending discussion with attending    Jarett Edwards PGY2 Pt evaluated at bedside due to dysuria.    Pt reports that she has suprapubic pressure with 5/10 urge to void despite voiding 150cc 30 mins ago. Pt reports she is unable to void further and is uncomfortable.  Bedside sono shows distended bladder.  Straight cath with 1000cc.  Gonzalez to stay in place for 12 hours.     d/w Dr. Beauchamp, attending physician    Jarett Edwards PGY2

## 2024-08-23 NOTE — OB RN PATIENT PROFILE - PRETERM DELIVERIES, OB PROFILE
GFRAA, AGRATIO, LABGLOM, GLUCOSE, GLU, PROT, CALCIUM, BILITOT, ALKPHOS, AST, ALT    POC Tests: No results for input(s): POCGLU, POCNA, POCK, POCCL, POCBUN, POCHEMO, POCHCT in the last 72 hours. Coags: No results found for: PROTIME, INR, APTT    HCG (If Applicable): No results found for: PREGTESTUR, PREGSERUM, HCG, HCGQUANT     ABGs: No results found for: PHART, PO2ART, UXV5AIT, JIR6GCW, BEART, M0UMJDPS     Type & Screen (If Applicable):  No results found for: LABABO, LABRH    Drug/Infectious Status (If Applicable):  No results found for: HIV, HEPCAB    COVID-19 Screening (If Applicable): No results found for: COVID19        Anesthesia Evaluation  Patient summary reviewed and Nursing notes reviewed no history of anesthetic complications:   Airway: Mallampati: II  TM distance: >3 FB   Neck ROM: full  Mouth opening: > = 3 FB   Dental:          Pulmonary:       (-) COPD, asthma and wheezes                           Cardiovascular:  Exercise tolerance: good (>4 METS),   (+) hypertension: moderate,     (-) CABG/stent, dysrhythmias and  angina      Rhythm: regular  Rate: normal                    Neuro/Psych:      (-) TIA and CVA            ROS comment: Hx retinal detachment GI/Hepatic/Renal:   (+) morbid obesity         ROS comment: Rare acid reflux symptoms with certain foods. Endo/Other:                      ROS comment: No FH of problems with GA Abdominal:             Vascular:     - DVT and PE. Other Findings:           Anesthesia Plan      general     ASA 3       Induction: intravenous. MIPS: Prophylactic antiemetics administered and Postoperative trial extubation. Anesthetic plan and risks discussed with patient. Plan discussed with CRNA.           Post-op pain plan if not by surgeon: single peripheral nerve block            Dianna Townsend MD   4/7/2023 0

## 2024-08-23 NOTE — OB RN PATIENT PROFILE - BREAST MILK PROVIDES PROTECTION AGAINST INFECTION
Hidradenitis suppurativa Hidradenitis suppurativa Hidradenitis suppurativa Hidradenitis suppurativa Hidradenitis suppurativa Hidradenitis suppurativa Hidradenitis suppurativa Hidradenitis suppurativa Hidradenitis suppurativa Hidradenitis suppurativa Statement Selected

## 2024-08-23 NOTE — OB RN DELIVERY SUMMARY - NSSELHIDDEN_OBGYN_ALL_OB_FT
[NS_DeliveryAttending1_OBGYN_ALL_OB_FT:[id number]],[NS_DeliveryAssist1_OBGYN_ALL_OB_FT:[id number]],[NS_DeliveryRN_OBGYN_ALL_OB_FT:[id number]],[NS_CirculateRN2_OBGYN_ALL_OB_FT:[id number]]

## 2024-08-23 NOTE — PROVIDER CONTACT NOTE (OTHER) - ACTION/TREATMENT ORDERED:
MD Edwards made aware , bladder scan done at 16:00, [id number] , Gonzalez to stay in for 12hrs  MD Edwards to make aware if patient able to go to PP

## 2024-08-24 RX ADMIN — Medication 600 MILLIGRAM(S): at 12:28

## 2024-08-24 RX ADMIN — ACETAMINOPHEN 975 MILLIGRAM(S): 325 TABLET ORAL at 22:16

## 2024-08-24 RX ADMIN — ACETAMINOPHEN 975 MILLIGRAM(S): 325 TABLET ORAL at 21:46

## 2024-08-24 RX ADMIN — ACETAMINOPHEN 975 MILLIGRAM(S): 325 TABLET ORAL at 16:30

## 2024-08-24 RX ADMIN — SODIUM CHLORIDE 3 MILLILITER(S): 9 INJECTION INTRAMUSCULAR; INTRAVENOUS; SUBCUTANEOUS at 06:07

## 2024-08-24 RX ADMIN — ACETAMINOPHEN 975 MILLIGRAM(S): 325 TABLET ORAL at 15:41

## 2024-08-24 RX ADMIN — ACETAMINOPHEN 975 MILLIGRAM(S): 325 TABLET ORAL at 10:03

## 2024-08-24 RX ADMIN — VITAMIN A, ASCORBIC ACID, VITAMIN D, .ALPHA.-TOCOPHEROL, THIAMINE MONONITRATE, RIBOFLAVIN, NIACIN, PYRIDOXINE HYDROCHLORIDE, FOLIC ACID, CYANOCOBALAMIN, CALCIUM, IRON, MAGNESIUM, ZINC, AND COPPER 1 TABLET(S): 2700; 70; 400; 30; 1.6; 1.8; 18; 2.5; 1; 12; 100; 65; 25; 25; 2 TABLET ORAL at 11:42

## 2024-08-24 RX ADMIN — SODIUM CHLORIDE 3 MILLILITER(S): 9 INJECTION INTRAMUSCULAR; INTRAVENOUS; SUBCUTANEOUS at 15:45

## 2024-08-24 RX ADMIN — Medication 600 MILLIGRAM(S): at 19:00

## 2024-08-24 RX ADMIN — ACETAMINOPHEN 975 MILLIGRAM(S): 325 TABLET ORAL at 09:25

## 2024-08-24 RX ADMIN — Medication 600 MILLIGRAM(S): at 06:13

## 2024-08-24 RX ADMIN — Medication 600 MILLIGRAM(S): at 11:43

## 2024-08-24 RX ADMIN — Medication 600 MILLIGRAM(S): at 18:19

## 2024-08-24 RX ADMIN — Medication 600 MILLIGRAM(S): at 05:43

## 2024-08-24 NOTE — LACTATION INITIAL EVALUATION - POTENTIAL FOR
ineffective breastfeeding/sore breast/s/sore nipples/engorgement/knowledge deficit/mastitis/feeding confusion/latch on difficulty

## 2024-08-24 NOTE — PROGRESS NOTE ADULT - SUBJECTIVE AND OBJECTIVE BOX
S: Patient doing well. Minimal lochia. Pain controlled.  Post Partum day : 1  O: Vital Signs Last 24 Hrs  T(C): 36.6 (24 Aug 2024 06:32), Max: 37.0 (23 Aug 2024 09:00)  T(F): 97.9 (24 Aug 2024 06:32), Max: 98.6 (23 Aug 2024 09:00)  HR: 68 (24 Aug 2024 06:32) (62 - 118)  BP: 96/52 (24 Aug 2024 06:32) (82/46 - 121/82)  BP(mean): 62 (24 Aug 2024 06:32) (60 - 62)  RR: 17 (24 Aug 2024 06:32) (15 - 18)  SpO2: 100% (24 Aug 2024 06:32) (81% - 100%)    Parameters below as of 24 Aug 2024 06:32  Patient On (Oxygen Delivery Method): room air        Gen: No acute distress  Abd: soft, NT,  fundus firm below umbilicus  Lochia:Normal  Ext: no tenderness    Labs:                        12.3   8.75  )-----------( 217      ( 23 Aug 2024 02:40 )             37.3       A: 39y PPD # 1 s/p  doing well.    Plan: Routine care.   Discharge home tomorrow.

## 2024-08-24 NOTE — PROGRESS NOTE ADULT - SUBJECTIVE AND OBJECTIVE BOX
POST OP DAY  1  s/p   SECTION    SUBJECTIVE:    PAIN SCALE SCORE: [x] Refer to charted pain scores    THERAPY:  [  ] Spinal morphine   [ x] Epidural morphine   [  ] IV PCA Hydromorphone 1 mg/ml    OBJECTIVE:   Vital Signs Last 24 Hrs  T(C): 36.6 (24 Aug 2024 06:32), Max: 36.9 (23 Aug 2024 18:36)  T(F): 97.9 (24 Aug 2024 06:32), Max: 98.4 (23 Aug 2024 18:36)  HR: 68 (24 Aug 2024 06:32) (62 - 118)  BP: 96/52 (24 Aug 2024 06:32) (92/50 - 114/68)  BP(mean): 62 (24 Aug 2024 06:32) (60 - 62)  RR: 17 (24 Aug 2024 06:32) (17 - 18)  SpO2: 100% (24 Aug 2024 06:32) (86% - 100%)    Parameters below as of 24 Aug 2024 06:32  Patient On (Oxygen Delivery Method): room air                        12.3   8.75  )-----------( 217      ( 23 Aug 2024 02:40 )             37.3     SEDATION SCORE:	  [ x ] Alert	    [  ] Drowsy        [  ] Arousable	[  ] Asleep	[  ] Unresponsive    Side Effects:	  [ x ] None	     [  ] Nausea        [  ] Pruritus        [  ] Weakness   [  ] Numbness        ASSESSMENT/ PLAN   [   ] Discontinue         [  ] Continue    [ x ] Change to prn Analgesics as per primary service.    DOCUMENTATION & VERIFICATION OF CURRENT MEDS [ x ] Done    COMMENTS: No Headache.

## 2024-08-25 VITALS
DIASTOLIC BLOOD PRESSURE: 61 MMHG | TEMPERATURE: 98 F | OXYGEN SATURATION: 100 % | SYSTOLIC BLOOD PRESSURE: 112 MMHG | HEART RATE: 72 BPM | RESPIRATION RATE: 18 BRPM

## 2024-08-25 PROCEDURE — 59400 OBSTETRICAL CARE: CPT | Mod: U7,GC

## 2024-08-25 RX ADMIN — ACETAMINOPHEN 975 MILLIGRAM(S): 325 TABLET ORAL at 03:35

## 2024-08-25 RX ADMIN — Medication 600 MILLIGRAM(S): at 05:44

## 2024-08-25 RX ADMIN — ACETAMINOPHEN 975 MILLIGRAM(S): 325 TABLET ORAL at 09:00

## 2024-08-25 RX ADMIN — Medication 600 MILLIGRAM(S): at 06:18

## 2024-08-25 RX ADMIN — ACETAMINOPHEN 975 MILLIGRAM(S): 325 TABLET ORAL at 03:05

## 2024-08-25 RX ADMIN — ACETAMINOPHEN 975 MILLIGRAM(S): 325 TABLET ORAL at 10:04

## 2024-08-25 RX ADMIN — VITAMIN A, ASCORBIC ACID, VITAMIN D, .ALPHA.-TOCOPHEROL, THIAMINE MONONITRATE, RIBOFLAVIN, NIACIN, PYRIDOXINE HYDROCHLORIDE, FOLIC ACID, CYANOCOBALAMIN, CALCIUM, IRON, MAGNESIUM, ZINC, AND COPPER 1 TABLET(S): 2700; 70; 400; 30; 1.6; 1.8; 18; 2.5; 1; 12; 100; 65; 25; 25; 2 TABLET ORAL at 11:52

## 2024-08-25 RX ADMIN — Medication 600 MILLIGRAM(S): at 11:52

## 2024-08-25 RX ADMIN — Medication 600 MILLIGRAM(S): at 12:22

## 2024-08-29 ENCOUNTER — APPOINTMENT (OUTPATIENT)
Dept: OBGYN | Facility: CLINIC | Age: 39
End: 2024-08-29

## 2024-09-03 ENCOUNTER — TRANSCRIPTION ENCOUNTER (OUTPATIENT)
Age: 39
End: 2024-09-03

## 2024-09-04 ENCOUNTER — APPOINTMENT (OUTPATIENT)
Age: 39
End: 2024-09-04
Payer: COMMERCIAL

## 2024-09-04 PROCEDURE — S9443: CPT | Mod: 95

## 2024-10-03 ENCOUNTER — APPOINTMENT (OUTPATIENT)
Dept: OBGYN | Facility: CLINIC | Age: 39
End: 2024-10-03
Payer: COMMERCIAL

## 2024-10-03 VITALS
BODY MASS INDEX: 23.19 KG/M2 | DIASTOLIC BLOOD PRESSURE: 60 MMHG | WEIGHT: 126 LBS | RESPIRATION RATE: 16 BRPM | HEIGHT: 62 IN | HEART RATE: 58 BPM | SYSTOLIC BLOOD PRESSURE: 96 MMHG | OXYGEN SATURATION: 98 %

## 2024-10-03 DIAGNOSIS — Z30.019 ENCOUNTER FOR INITIAL PRESCRIPTION OF CONTRACEPTIVES, UNSPECIFIED: ICD-10-CM

## 2024-10-03 PROCEDURE — 99214 OFFICE O/P EST MOD 30 MIN: CPT

## 2024-10-03 RX ORDER — NORETHINDRONE 0.35 MG/1
0.35 TABLET ORAL DAILY
Qty: 1 | Refills: 11 | Status: ACTIVE | COMMUNITY
Start: 2024-10-03 | End: 1900-01-01

## 2024-10-03 NOTE — HISTORY OF PRESENT ILLNESS
[FreeTextEntry1] : SANDER ZARAGOZA 40 YO, presents for Post Partum G 4 P 3013 -  on 24 @ ;[facility]. 1 Miscarriage Nursing Medication: PNV. No complaints.  Contraception discussion.  Want's POP. Nonsmoker, NKDA.

## 2024-10-03 NOTE — HISTORY OF PRESENT ILLNESS
[FreeTextEntry1] : SANDER ZARAGOZA 40 YO, presents for Post Partum G 4 P 3013 -  on 24 @ ;[facility]. 1 Miscarriage Nursing Medication: PNV. No complaints.  Contraception discussion.  Want's POP. Nonsmoker, NKDA.

## 2025-01-12 NOTE — DISCHARGE NOTE OB - PROVIDER TOKENS
PROVIDER:[TOKEN:[[id number]]] PAST MEDICAL HISTORY:  Depression     DM (diabetes mellitus) type 2    Glaucoma     HLD (hyperlipidemia)     HTN (hypertension)     Memory loss     Poor balance     TIA (transient ischemic attack) recurrent

## 2025-03-16 NOTE — OB PROVIDER H&P - NSHPSOCIALHISTORY_GEN_ALL_CORE
8 (severe pain) Denies alcohol/tobacco/drug use during pregnancy.  Feels safe in current home environment.  Denies h/o anxiety/depression.

## 2025-05-02 ENCOUNTER — APPOINTMENT (OUTPATIENT)
Dept: OBGYN | Facility: CLINIC | Age: 40
End: 2025-05-02

## 2025-06-04 ENCOUNTER — APPOINTMENT (OUTPATIENT)
Dept: OBGYN | Facility: CLINIC | Age: 40
End: 2025-06-04
Payer: COMMERCIAL

## 2025-06-04 ENCOUNTER — NON-APPOINTMENT (OUTPATIENT)
Age: 40
End: 2025-06-04

## 2025-06-04 ENCOUNTER — ASOB RESULT (OUTPATIENT)
Age: 40
End: 2025-06-04

## 2025-06-04 VITALS
WEIGHT: 122 LBS | BODY MASS INDEX: 22.45 KG/M2 | DIASTOLIC BLOOD PRESSURE: 60 MMHG | HEART RATE: 65 BPM | HEIGHT: 62 IN | SYSTOLIC BLOOD PRESSURE: 92 MMHG | RESPIRATION RATE: 16 BRPM | OXYGEN SATURATION: 99 % | TEMPERATURE: 98.3 F

## 2025-06-04 DIAGNOSIS — Z01.419 ENCOUNTER FOR GYNECOLOGICAL EXAMINATION (GENERAL) (ROUTINE) W/OUT ABNORMAL FINDINGS: ICD-10-CM

## 2025-06-04 DIAGNOSIS — Z30.019 ENCOUNTER FOR INITIAL PRESCRIPTION OF CONTRACEPTIVES, UNSPECIFIED: ICD-10-CM

## 2025-06-04 DIAGNOSIS — N92.0 EXCESSIVE AND FREQUENT MENSTRUATION WITH REGULAR CYCLE: ICD-10-CM

## 2025-06-04 PROCEDURE — 99395 PREV VISIT EST AGE 18-39: CPT

## 2025-06-04 PROCEDURE — 99213 OFFICE O/P EST LOW 20 MIN: CPT | Mod: 25

## 2025-06-04 PROCEDURE — 36415 COLL VENOUS BLD VENIPUNCTURE: CPT

## 2025-06-04 PROCEDURE — 76830 TRANSVAGINAL US NON-OB: CPT

## 2025-06-04 RX ORDER — NORETHINDRONE ACETATE AND ETHINYL ESTRADIOL AND FERROUS FUMARATE 1MG-20(21)
1-20 KIT ORAL
Qty: 1 | Refills: 12 | Status: ACTIVE | COMMUNITY
Start: 2025-06-04 | End: 1900-01-01

## 2025-06-05 LAB
C TRACH RRNA SPEC QL NAA+PROBE: NOT DETECTED
HCG SERPL-MCNC: <1 MIU/ML
HCT VFR BLD CALC: 40.7 %
HGB BLD-MCNC: 12.8 G/DL
MCHC RBC-ENTMCNC: 28.5 PG
MCHC RBC-ENTMCNC: 31.4 G/DL
MCV RBC AUTO: 90.6 FL
N GONORRHOEA RRNA SPEC QL NAA+PROBE: NOT DETECTED
PLATELET # BLD AUTO: 313 K/UL
RBC # BLD: 4.49 M/UL
RBC # FLD: 12.2 %
SOURCE TP AMPLIFICATION: NORMAL
TSH SERPL-ACNC: 2.23 UIU/ML
WBC # FLD AUTO: 5.09 K/UL

## 2025-06-07 LAB — CYTOLOGY CVX/VAG DOC THIN PREP: NORMAL

## 2025-07-25 ENCOUNTER — APPOINTMENT (OUTPATIENT)
Dept: OBGYN | Facility: CLINIC | Age: 40
End: 2025-07-25
Payer: COMMERCIAL

## 2025-07-25 VITALS
RESPIRATION RATE: 16 BRPM | BODY MASS INDEX: 23.19 KG/M2 | TEMPERATURE: 98.2 F | SYSTOLIC BLOOD PRESSURE: 96 MMHG | HEIGHT: 62 IN | HEART RATE: 81 BPM | WEIGHT: 126 LBS | OXYGEN SATURATION: 98 % | DIASTOLIC BLOOD PRESSURE: 62 MMHG

## 2025-07-25 DIAGNOSIS — B37.31 ACUTE CANDIDIASIS OF VULVA AND VAGINA: ICD-10-CM

## 2025-07-25 PROCEDURE — 99459 PELVIC EXAMINATION: CPT | Mod: NC

## 2025-07-25 PROCEDURE — 99213 OFFICE O/P EST LOW 20 MIN: CPT

## 2025-07-25 RX ORDER — CLOTRIMAZOLE AND BETAMETHASONE DIPROPIONATE 10; .5 MG/G; MG/G
1-0.05 CREAM TOPICAL TWICE DAILY
Qty: 1 | Refills: 1 | Status: ACTIVE | COMMUNITY
Start: 2025-07-25 | End: 1900-01-01

## 2025-07-26 LAB
ESTIMATED AVERAGE GLUCOSE: 105 MG/DL
HBA1C MFR BLD HPLC: 5.3 %

## 2025-07-28 LAB
BV BACTERIA RRNA VAG QL NAA+PROBE: NOT DETECTED
C GLABRATA RNA VAG QL NAA+PROBE: NOT DETECTED
C TRACH RRNA SPEC QL NAA+PROBE: NOT DETECTED
CANDIDA RRNA VAG QL PROBE: DETECTED
N GONORRHOEA RRNA SPEC QL NAA+PROBE: NOT DETECTED
T VAGINALIS RRNA SPEC QL NAA+PROBE: NOT DETECTED